# Patient Record
Sex: FEMALE | Race: BLACK OR AFRICAN AMERICAN | NOT HISPANIC OR LATINO | Employment: UNEMPLOYED | URBAN - METROPOLITAN AREA
[De-identification: names, ages, dates, MRNs, and addresses within clinical notes are randomized per-mention and may not be internally consistent; named-entity substitution may affect disease eponyms.]

---

## 2019-06-10 ENCOUNTER — TELEPHONE (OUTPATIENT)
Dept: ENDOCRINOLOGY | Facility: CLINIC | Age: 54
End: 2019-06-10

## 2019-07-29 ENCOUNTER — OFFICE VISIT (OUTPATIENT)
Dept: ENDOCRINOLOGY | Facility: CLINIC | Age: 54
End: 2019-07-29
Payer: COMMERCIAL

## 2019-07-29 VITALS
RESPIRATION RATE: 18 BRPM | HEIGHT: 67 IN | WEIGHT: 293 LBS | BODY MASS INDEX: 45.99 KG/M2 | SYSTOLIC BLOOD PRESSURE: 104 MMHG | DIASTOLIC BLOOD PRESSURE: 76 MMHG | HEART RATE: 96 BPM

## 2019-07-29 DIAGNOSIS — E11.40 TYPE 2 DIABETES MELLITUS WITH DIABETIC NEUROPATHY, WITH LONG-TERM CURRENT USE OF INSULIN: Primary | ICD-10-CM

## 2019-07-29 DIAGNOSIS — E78.5 HYPERLIPIDEMIA, UNSPECIFIED HYPERLIPIDEMIA TYPE: ICD-10-CM

## 2019-07-29 DIAGNOSIS — E66.01 MORBID OBESITY WITH BMI OF 45.0-49.9, ADULT: ICD-10-CM

## 2019-07-29 DIAGNOSIS — Z79.4 TYPE 2 DIABETES MELLITUS WITH RETINOPATHY, WITH LONG-TERM CURRENT USE OF INSULIN, MACULAR EDEMA PRESENCE UNSPECIFIED, UNSPECIFIED LATERALITY, UNSPECIFIED RETINOPATHY SEVERITY: ICD-10-CM

## 2019-07-29 DIAGNOSIS — E11.319 TYPE 2 DIABETES MELLITUS WITH RETINOPATHY, WITH LONG-TERM CURRENT USE OF INSULIN, MACULAR EDEMA PRESENCE UNSPECIFIED, UNSPECIFIED LATERALITY, UNSPECIFIED RETINOPATHY SEVERITY: ICD-10-CM

## 2019-07-29 DIAGNOSIS — Z79.4 TYPE 2 DIABETES MELLITUS WITH DIABETIC NEUROPATHY, WITH LONG-TERM CURRENT USE OF INSULIN: Primary | ICD-10-CM

## 2019-07-29 DIAGNOSIS — I10 ESSENTIAL HYPERTENSION: ICD-10-CM

## 2019-07-29 PROBLEM — E11.9 TYPE 2 DIABETES MELLITUS, WITH LONG-TERM CURRENT USE OF INSULIN: Status: ACTIVE | Noted: 2019-07-29

## 2019-07-29 LAB — GLUCOSE SERPL-MCNC: 69 MG/DL (ref 70–110)

## 2019-07-29 PROCEDURE — 3008F BODY MASS INDEX DOCD: CPT | Mod: CPTII,S$GLB,, | Performed by: NURSE PRACTITIONER

## 2019-07-29 PROCEDURE — 99999 PR PBB SHADOW E&M-EST. PATIENT-LVL V: ICD-10-PCS | Mod: PBBFAC,,, | Performed by: NURSE PRACTITIONER

## 2019-07-29 PROCEDURE — 99999 PR PBB SHADOW E&M-EST. PATIENT-LVL V: CPT | Mod: PBBFAC,,, | Performed by: NURSE PRACTITIONER

## 2019-07-29 PROCEDURE — 82962 GLUCOSE BLOOD TEST: CPT | Mod: S$GLB,,, | Performed by: NURSE PRACTITIONER

## 2019-07-29 PROCEDURE — 82962 POCT GLUCOSE, HAND-HELD DEVICE: ICD-10-PCS | Mod: S$GLB,,, | Performed by: NURSE PRACTITIONER

## 2019-07-29 PROCEDURE — 3008F PR BODY MASS INDEX (BMI) DOCUMENTED: ICD-10-PCS | Mod: CPTII,S$GLB,, | Performed by: NURSE PRACTITIONER

## 2019-07-29 PROCEDURE — 99204 OFFICE O/P NEW MOD 45 MIN: CPT | Mod: S$GLB,,, | Performed by: NURSE PRACTITIONER

## 2019-07-29 PROCEDURE — 99204 PR OFFICE/OUTPT VISIT, NEW, LEVL IV, 45-59 MIN: ICD-10-PCS | Mod: S$GLB,,, | Performed by: NURSE PRACTITIONER

## 2019-07-29 RX ORDER — INSULIN GLARGINE 100 [IU]/ML
INJECTION, SOLUTION SUBCUTANEOUS
Refills: 5 | COMMUNITY
Start: 2019-07-13 | End: 2019-07-29

## 2019-07-29 RX ORDER — BENZONATATE 100 MG/1
CAPSULE ORAL
Refills: 0 | COMMUNITY
Start: 2019-07-17 | End: 2020-07-23

## 2019-07-29 RX ORDER — MONTELUKAST SODIUM 10 MG/1
TABLET ORAL
Refills: 1 | COMMUNITY
Start: 2019-07-13 | End: 2020-07-23

## 2019-07-29 RX ORDER — LOSARTAN POTASSIUM AND HYDROCHLOROTHIAZIDE 25; 100 MG/1; MG/1
TABLET ORAL
Refills: 1 | COMMUNITY
Start: 2019-05-20 | End: 2020-07-23

## 2019-07-29 RX ORDER — GABAPENTIN 300 MG/1
CAPSULE ORAL
Refills: 5 | COMMUNITY
Start: 2019-05-20 | End: 2020-07-23

## 2019-07-29 RX ORDER — BUPROPION HYDROCHLORIDE 150 MG/1
TABLET ORAL
Refills: 1 | COMMUNITY
Start: 2019-07-13

## 2019-07-29 RX ORDER — ASPIRIN 81 MG/1
81 TABLET ORAL DAILY
COMMUNITY
End: 2020-07-23

## 2019-07-29 RX ORDER — ATORVASTATIN CALCIUM 40 MG/1
TABLET, FILM COATED ORAL
Refills: 0 | COMMUNITY
Start: 2019-05-09

## 2019-07-29 RX ORDER — ZOLPIDEM TARTRATE 10 MG/1
TABLET ORAL
Refills: 1 | COMMUNITY
Start: 2019-07-17

## 2019-07-29 RX ORDER — INSULIN GLARGINE 100 [IU]/ML
60 INJECTION, SOLUTION SUBCUTANEOUS NIGHTLY
Qty: 2 BOX | Refills: 6 | Status: SHIPPED | OUTPATIENT
Start: 2019-07-29 | End: 2020-07-23

## 2019-07-29 RX ORDER — TRAZODONE HYDROCHLORIDE 50 MG/1
TABLET ORAL
Refills: 2 | COMMUNITY
Start: 2019-07-13

## 2019-07-29 RX ORDER — QUETIAPINE FUMARATE 200 MG/1
TABLET, FILM COATED ORAL
Refills: 0 | COMMUNITY
Start: 2019-06-17

## 2019-07-29 RX ORDER — INSULIN ASPART 100 [IU]/ML
INJECTION, SOLUTION INTRAVENOUS; SUBCUTANEOUS
Refills: 3 | COMMUNITY
Start: 2019-07-14

## 2019-07-29 RX ORDER — AMLODIPINE BESYLATE 10 MG/1
TABLET ORAL
Refills: 1 | COMMUNITY
Start: 2019-07-13 | End: 2020-07-23

## 2019-07-29 NOTE — LETTER
July 29, 2019      Yumiko Pruitt MD  [address] [facility]  [facility] [place] [address]           [place] - Endocrinology  [address] [facility] [address] [place] [address]  Phone: [phone]  Fax: [phone]          Patient: Tracey Campbell   MR Number: [id number]   YOB: 1965   Date of Visit: 7/29/2019       Dear Dr. Yumiko Pruitt:    Thank you for referring Tracey Campbell to me for evaluation. Attached you will find relevant portions of my assessment and plan of care.    If you have questions, please do not hesitate to call me. I look forward to following Tracey Campbell along with you.    Sincerely,    BRYANT Pittman,FNP-C    Enclosure  CC:  No Recipients    If you would like to receive this communication electronically, please contact [email or web address] or [phone] to request more information on [employer] Link access.    For providers and/or their staff who would like to refer a patient to [facility], please contact us through our one-stop-shop provider referral line, [facility], at [phone].    If you feel you have received this communication in error or would no longer like to receive these types of communications, please e-mail [email or web address]

## 2019-07-29 NOTE — PROGRESS NOTES
CC: Ms. Tracey Campbell arrives today for management of Type 2 DM and review of chronic medical conditions, as listed in the Visit Diagnosis section of this encounter.       HPI: Ms. Tracey Campbell was diagnosed with Type 2 DM in at age 37. She was diagnosed based on lab work. Initial treatment consisted of insulin. She then moved to [place] and began metformin and was able to wean off of insulin after losing 120 lbs. She later gained the weight back and resumed insulin in ~ . + FH of DM in both parents. She was previously hospitalized due to hyperglycemia during UTI. DM is complicated by retinopathy, neuropathy.    This is her first time being seen in endocrine.     Her PCP is Dr. Yumiko Pruitt. She states that her recent A1c was 11% this month.    BG readings are checked 2-3x/day. No log or meter to clinic today. Reports the following:  Fastins  Dinner: 300s    She reports that her BG has been > 500 over the past 2 days. She attributes this to poor dietary choices.     She was 559 this AM and took Novolog 30 units. She then rechecked and glucose was down to 400s range so she took an additional 14 units of Novolog.    Hypoglycemia: No  Hypoglycemic Symptoms: dizziness  Hypoglycemia Treatment: juice    Missing Insulin/PO medication doses: Yes - may miss Novolog when at work.   Timing prandial insulin 5-15 minutes before meals: Usually takes 1 hour ahead of meal.     Exercise: No    Dietary Habits: Eats 1-2 meals/day. Always skips breakfast. Might eat lunch. Always eats dinner. Snacks on chips or fruit. Avoids sugary beverages. .    Last DM education appointment: not recently      CURRENT DIABETIC MEDS: Lantus 45 units QHS, Novolog 30 units AC  Vial or pen: Lantus vial, Novolog pen  Glucometer type: One Touch Ultra    Previous DM treatments:  Metformin  Farxiga - UTIs      Last Eye Exam: 2019, [facility].  +   Last Podiatry Exam: 2019, Dr. Shepherd. H/o chronic Ulcer    REVIEW OF  "SYSTEMS  Constitutional: no c/o fatigue, weakness. + intentional 15# weight loss   Eyes: + blurred vision  ENT: denies dysphagia, hearing loss  Cardiac: no palpitations or chest pain.  Respiratory: no cough. + dyspnea for which she is seeing pulmonology today.  GI: no c/o abdominal pain or nausea. Denies h/o pancreatitis.   : denies urinary frequency, burning, discharge, frequent UTIs.  Skin: no lesions or rashes.  Musculoskeletal: denies difficulty mobilizing, denies muscle or joint pains  Neuro: + numbness, tingling in BLE  Endocrine: denies polyphagia. + polydipsia, polyuria over the past 2 days      Personally reviewed Past Medical, Surgical, Social History.    Vital Signs  /76   Pulse 96   Resp 18   Ht 5' 7" (1.702 m)   Wt (!) 139.5 kg (307 lb 8.7 oz)   BMI 48.17 kg/m²     Personally reviewed the below labs:    No results found for: HGBA1C    Chemistry    No results found for: NA, K, CL, CO2, BUN, CREATININE, GLU No results found for: CALCIUM, ALKPHOS, AST, ALT, BILITOT, ESTGFRAFRICA, EGFRNONAA       No results found for: CHOL  No results found for: HDL  No results found for: LDLCALC  No results found for: TRIG  No results found for: CHOLHDL    No results found for: MICALBCREAT  No results found for: TSH    CrCl cannot be calculated (No order found.).    No results found for: [id number]      PHYSICAL EXAMINATION  Constitutional: Appears well, no distress, morbidly obese  Neck: Supple, trachea midline; no thyromegaly or nodules.   Respiratory: CTA, even and unlabored.  Cardiovascular: RRR, no murmurs, no carotid bruits. DP pulses  2+ bilaterally; no edema.    GI: bowel sounds active, no hernia noted  Lymph: no cervical or supraclavicular lymphadenopathy  Skin: warm and dry; no lipohypertrophy, or acanthosis nigracans observed.  Psych: normal affect, pleasant mood, conversant  Musculoskeletal: steady gait, no clubbing, full ROM to all extremities  Neuro: DTR diminished to BUE/1+BLE.  Feet: " appropriate footwear.      A1c target < 7%      Assessment/Plan  1. Type 2 diabetes mellitus with diabetic neuropathy, with long-term current use of insulin  -- Uncontrolled. POCT glucose 69 mg/dL. I advised pt to only use correction scale if she is having hyperglycemia and skipping a meal. Unequal basal/prandial doses.   -- will weight base pt 0.8-0.9 u/kg. Increase Lantus to 60 units QHS.  -- continue Novolog 30 units AC. Correction scale, target 150, ISF 25. Discussed proper timing,  -- start Ozempic 0.25 mg weekly. After 4 weeks, increase dose to 0.5 mg weekly. Discussed medication's mechanism of action, side effects, and contraindications. Advised pt to notify me for extreme nausea, abdominal pain, etc.  -- check BG 4x/day and bring log to follow up appt  -- Ambulatory Referral to Diabetes Education for diet  -- will obtain recent lab work from PCP  -- call if glucose remains > 400 mg/dL after dose adjustment.     -- Reviewed hypoglycemia management: treat with 1/2 glass of juice, 1/2 can regular coke, or 4 glucose tablets. Monitor and repeat treatment every 15 minutes until BG is >70 Then have a snack, which includes a complex carbohydrate and protein.   Advised patient to check BG before activities, such as driving or exercise.    -- takes aspirin, arb, statin     2. Type 2 diabetes mellitus with retinopathy, with long-term current use of insulin, macular edema presence unspecified, unspecified laterality, unspecified retinopathy severity  -- will call to obtain eye report  -- optimize DM control  -- maintain follow up with Ophtho   3. Essential hypertension  -- controlled  -- continue current meds   4. Hyperlipidemia, unspecified hyperlipidemia type  -- continue statin   5. Morbid obesity with BMI of 45.0-49.9, adult  -- uncontrolled  -- Ozempic may help with weight loss       FOLLOW UP  Follow up in about 6 weeks (around 9/9/2019).   Patient instructed to bring BG logs to each follow up   Patient encouraged  to call for any BG/medication issues, concerns, or questions.    Orders Placed This Encounter   Procedures    Ambulatory Referral to Diabetes Education    POCT Glucose, Hand-Held Device

## 2019-07-30 ENCOUNTER — TELEPHONE (OUTPATIENT)
Dept: ENDOCRINOLOGY | Facility: CLINIC | Age: 54
End: 2019-07-30

## 2019-07-31 ENCOUNTER — TELEPHONE (OUTPATIENT)
Dept: ENDOCRINOLOGY | Facility: CLINIC | Age: 54
End: 2019-07-31

## 2019-07-31 NOTE — TELEPHONE ENCOUNTER
Please call pt and offer her either once weekly Bydureon or daily Victoza. It is her preference. She will need education for Bydureon. Vickyaraza works like her insulin pen.

## 2019-07-31 NOTE — TELEPHONE ENCOUNTER
Pts insurance denied Ozempic PT needs a trial/fail to a formulary alternative  Byetta, Bydureon, Victoza  Please advise on formulary change

## 2019-08-06 ENCOUNTER — TELEPHONE (OUTPATIENT)
Dept: ENDOCRINOLOGY | Facility: CLINIC | Age: 54
End: 2019-08-06

## 2019-08-06 NOTE — TELEPHONE ENCOUNTER
Attempted to reach pt regarding changing Ozempic to another brand, pt did not answer, pt does not have voicemail

## 2019-09-12 ENCOUNTER — TELEPHONE (OUTPATIENT)
Dept: ENDOCRINOLOGY | Facility: CLINIC | Age: 54
End: 2019-09-12

## 2019-11-21 ENCOUNTER — PES CALL (OUTPATIENT)
Dept: ADMINISTRATIVE | Facility: CLINIC | Age: 54
End: 2019-11-21

## 2020-06-15 ENCOUNTER — HOSPITAL ENCOUNTER (OUTPATIENT)
Dept: RADIOLOGY | Facility: HOSPITAL | Age: 55
Discharge: HOME | End: 2020-06-15
Attending: FAMILY MEDICINE
Payer: MEDICAID

## 2020-06-15 VITALS — BODY MASS INDEX: 48.34 KG/M2 | WEIGHT: 293 LBS

## 2020-06-15 DIAGNOSIS — Z12.31 ENCOUNTER FOR SCREENING MAMMOGRAM FOR MALIGNANT NEOPLASM OF BREAST: ICD-10-CM

## 2020-06-15 PROCEDURE — 77063 BREAST TOMOSYNTHESIS BI: CPT | Mod: 26,,, | Performed by: RADIOLOGY

## 2020-06-15 PROCEDURE — 77067 MAMMO DIGITAL SCREENING BILAT WITH TOMOSYNTHESIS_CAD: ICD-10-PCS | Mod: 26,,, | Performed by: RADIOLOGY

## 2020-06-15 PROCEDURE — 77067 SCR MAMMO BI INCL CAD: CPT | Mod: TC,PO

## 2020-06-15 PROCEDURE — 77063 MAMMO DIGITAL SCREENING BILAT WITH TOMOSYNTHESIS_CAD: ICD-10-PCS | Mod: 26,,, | Performed by: RADIOLOGY

## 2020-06-15 PROCEDURE — 77067 SCR MAMMO BI INCL CAD: CPT | Mod: 26,,, | Performed by: RADIOLOGY

## 2020-07-20 ENCOUNTER — OFFICE VISIT (OUTPATIENT)
Dept: URGENT CARE | Facility: CLINIC | Age: 55
End: 2020-07-20
Payer: MEDICAID

## 2020-07-20 VITALS
SYSTOLIC BLOOD PRESSURE: 151 MMHG | DIASTOLIC BLOOD PRESSURE: 88 MMHG | OXYGEN SATURATION: 97 % | TEMPERATURE: 98 F | HEART RATE: 99 BPM

## 2020-07-20 DIAGNOSIS — S91.302A OPEN WOUND OF LEFT FOOT EXCLUDING ONE OR MORE TOES, INITIAL ENCOUNTER: Primary | ICD-10-CM

## 2020-07-20 DIAGNOSIS — N89.8 VAGINAL ITCHING: ICD-10-CM

## 2020-07-20 LAB
BILIRUB UR QL STRIP: NEGATIVE
GLUCOSE UR QL STRIP: POSITIVE
KETONES UR QL STRIP: NEGATIVE
LEUKOCYTE ESTERASE UR QL STRIP: NEGATIVE
PH, POC UA: 5 (ref 5–8)
POC BLOOD, URINE: NEGATIVE
POC NITRATES, URINE: NEGATIVE
PROT UR QL STRIP: POSITIVE
SP GR UR STRIP: 1.02 (ref 1–1.03)
UROBILINOGEN UR STRIP-ACNC: ABNORMAL (ref 0.1–1.1)

## 2020-07-20 PROCEDURE — 81003 URINALYSIS AUTO W/O SCOPE: CPT | Mod: QW,S$GLB,, | Performed by: PHYSICIAN ASSISTANT

## 2020-07-20 PROCEDURE — 99203 PR OFFICE/OUTPT VISIT, NEW, LEVL III, 30-44 MIN: ICD-10-PCS | Mod: 25,S$GLB,, | Performed by: PHYSICIAN ASSISTANT

## 2020-07-20 PROCEDURE — 99203 OFFICE O/P NEW LOW 30 MIN: CPT | Mod: 25,S$GLB,, | Performed by: PHYSICIAN ASSISTANT

## 2020-07-20 PROCEDURE — 81003 POCT URINALYSIS, DIPSTICK, AUTOMATED, W/O SCOPE: ICD-10-PCS | Mod: QW,S$GLB,, | Performed by: PHYSICIAN ASSISTANT

## 2020-07-20 RX ORDER — CETIRIZINE HYDROCHLORIDE 10 MG/1
10 TABLET ORAL
COMMUNITY
Start: 2019-12-16 | End: 2020-07-23

## 2020-07-20 RX ORDER — HYDROCHLOROTHIAZIDE 25 MG/1
TABLET ORAL
COMMUNITY
Start: 2020-04-29 | End: 2021-01-09

## 2020-07-20 RX ORDER — FLUCONAZOLE 150 MG/1
150 TABLET ORAL ONCE
Qty: 1 TABLET | Refills: 1 | Status: SHIPPED | OUTPATIENT
Start: 2020-07-20 | End: 2020-07-20

## 2020-07-20 RX ORDER — ALBUTEROL SULFATE 0.83 MG/ML
SOLUTION RESPIRATORY (INHALATION)
COMMUNITY
Start: 2019-10-30

## 2020-07-20 RX ORDER — DULAGLUTIDE 1.5 MG/.5ML
INJECTION, SOLUTION SUBCUTANEOUS
COMMUNITY
Start: 2020-06-07 | End: 2020-07-20

## 2020-07-20 RX ORDER — FLUTICASONE PROPIONATE 50 MCG
2 SPRAY, SUSPENSION (ML) NASAL
COMMUNITY
Start: 2019-12-16

## 2020-07-21 NOTE — PROGRESS NOTES
Subjective:       Patient ID: Tracey Campbell is a 54 y.o. female.    Vitals:  temperature is 97.8 °F (36.6 °C). Her blood pressure is 151/88 (abnormal) and her pulse is 99. Her oxygen saturation is 97%.     Chief Complaint: Wound Infection    ? Infected wound     Wound Check  She was originally treated more than 14 days ago.       Constitution: Negative for chills and fever.   HENT: Negative for facial swelling and sore throat.    Neck: Negative for painful lymph nodes.   Eyes: Negative for eye itching and eyelid swelling.   Respiratory: Negative for cough.    Musculoskeletal: Negative for joint pain and joint swelling.   Skin: Positive for wound. Negative for color change, pale, abrasion, laceration, lesion, skin thickening/induration, puncture wound, erythema, bruising, abscess, avulsion and hives.   Allergic/Immunologic: Negative for environmental allergies, immunocompromised state and hives.   Hematologic/Lymphatic: Negative for swollen lymph nodes.       Objective:      Physical Exam   Skin: erythema        Assessment:       No diagnosis found.    Plan:         There are no diagnoses linked to this encounter.

## 2020-07-21 NOTE — PROGRESS NOTES
Subjective:       Patient ID: Tracey Campbell is a 54 y.o. female.    Vitals:  temperature is 97.8 °F (36.6 °C). Her blood pressure is 151/88 (abnormal) and her pulse is 99. Her oxygen saturation is 97%.     Chief Complaint: Wound Infection    Pt reports left foot infection.  She states that for 8 weeks she had a PICC line to treat the infection and had a surgery to remove a portion of her bone.  Pt reports that the surgery was in [place].  Pt has now moved back to [place].  She saw the surgeon last week due to her concerns of a recurring infection but states at that time the wound was not draining.  She was not treated with antibiotics at that time and was informed by the surgeon to get an MRI prior to her next visit with him.  Pt did not get the MRI as she was told she would have to pay cash.  She also reports that prior to her last visit with her doctor, she was on a 10 day course of antibiotics but cannot recall the name.    Pt c/o vaginal itching for over a week and states that she will scratch to the point where she has noticed blood.        Constitution: Negative for chills and fever.   HENT: Negative for facial swelling and sore throat.    Neck: Negative for painful lymph nodes.   Eyes: Negative for eye itching and eyelid swelling.   Respiratory: Negative for cough.    Genitourinary: Negative for dysuria, frequency and urgency.   Musculoskeletal: Negative for joint pain and joint swelling.   Skin: Positive for wound. Negative for color change, pale, abrasion, laceration, lesion, skin thickening/induration, puncture wound, erythema, bruising, abscess, avulsion and hives.   Allergic/Immunologic: Negative for environmental allergies, immunocompromised state and hives.   Hematologic/Lymphatic: Negative for swollen lymph nodes.       Objective:      Physical Exam   Constitutional: She is oriented to person, place, and time. She appears well-developed.   HENT:   Head: Normocephalic and atraumatic. Head is without  abrasion, without contusion and without laceration.   Ears:   Right Ear: External ear normal.   Left Ear: External ear normal.   Nose: Nose normal.   Eyes: Pupils are equal, round, and reactive to light. Conjunctivae, EOM and lids are normal.   Neck: Full passive range of motion without pain and phonation normal. Neck supple.   Cardiovascular: Normal rate.   Pulmonary/Chest: Effort normal. No respiratory distress.   Musculoskeletal: Normal range of motion.   Neurological: She is alert and oriented to person, place, and time.   Skin: Skin is warm, dry, intact and no rash. Capillary refill takes less than 2 seconds. Lesions:  lesionabrasion, burn, bruising, erythema and ecchymosisPsychiatric: Her speech is normal and behavior is normal. Judgment and thought content normal.   Nursing note and vitals reviewed.              Results for orders placed or performed in visit on 07/20/20   POCT Urinalysis, Dipstick, Automated, W/O Scope   Result Value Ref Range    POC Blood, Urine Negative Negative    POC Bilirubin, Urine Negative Negative    POC Urobilinogen, Urine norm 0.1 - 1.1    POC Ketones, Urine Negative Negative    POC Protein, Urine Positive (A) Negative    POC Nitrates, Urine Negative Negative    POC Glucose, Urine Positive (A) Negative    pH, UA 5.0 5 - 8    POC Specific Gravity, Urine 1.020 1.003 - 1.029    POC Leukocytes, Urine Negative Negative     Results reviewed with pt  Assessment:       1. Open wound of left foot excluding one or more toes, initial encounter    2. Vaginal itching        Plan:         Open wound of left foot excluding one or more toes, initial encounter  -     Cancel: Ambulatory referral/consult to Wound Clinic  -     Ambulatory referral/consult to Internal Medicine  -     Ambulatory referral/consult to Wound Clinic    Vaginal itching  -     POCT Urinalysis, Dipstick, Automated, W/O Scope  -     NuSwab Vaginitis Plus (VG+)  -     Culture, Urine    Other orders  -     fluconazole (DIFLUCAN)  150 MG Tab; Take 1 tablet (150 mg total) by mouth once. for 1 dose  Dispense: 1 tablet; Refill: 1         Patient Instructions       Candida Vaginal Infection    You have a Candida vaginal infection. This is also known as a yeast infection. It is most often caused by a type of yeast (fungus) called Candida. Candida are normally found in the vagina. But if they increase in number, this can lead to infection and cause symptoms.  Symptoms of a yeast infection can include:  · Clumpy or thin, white discharge, which may look like cottage cheese  · Itching or burning  · Burning with urination  Certain factors can make a yeast infection more likely. These can include:  · Taking certain medicines, such as antibiotics or birth control pills  · Pregnancy  · Diabetes  · Weakened immune system  A yeast infection is most often treated with antifungal medicine. This may be given as a vaginal cream or pills you take by mouth. Treatment may last for about 1 to 7 days. Women with severe or recurrent infections may need longer courses of treatment.  Home care  · If youre prescribed medicine, be sure to use it as directed. Finish all of the medicine, even if your symptoms go away. Note: Dont try to treat yourself using over-the-counter products without talking to your provider first. He or she will let you know if this is a good option for you.  · Ask your provider what steps you can take to help reduce your risk of having a yeast infection in the future.  Follow-up care  Follow up with your healthcare provider, or as directed.  When to seek medical advice  Call your healthcare provider right away if:  · You have a fever of 100.4ºF (38ºC) or higher, or as directed by your provider.  · Your symptoms worsen, or they dont go away within a few days of starting treatment.  · You have new pain in the lower belly or pelvic region.  · You have side effects that bother you or a reaction to the cream or pills youre prescribed.  · You or any  partners you have sex with have new symptoms, such as a rash, joint pain, or sores.  Date Last Reviewed: 7/30/2015  © 8688-9514 [employer]. [address] [place] [address], [place] [address]. All rights reserved. This information is not intended as a substitute for professional medical care. Always follow your healthcare professional's instructions.        Discharge Instructions for Diabetic Foot Ulcers  You have been diagnosed with foot ulcers related to diabetes. Diabetes is a disease that makes it very hard to control your blood sugar. One dangerous complication of diabetes is a higher risk of developing serious foot problems. Wounds, even minor ones, can easily become infected when you have diabetes. These infections can become life-threatening if they aren't treated. Infections that settle in the bones can also spread throughout your foot and leg, destroying bone as they travel.    Your healthcare provider wants you to practice good diabetic foot care. This can help make sure that hot spots, small cracks, or sores are treated before they get infected. If infection is already present, medicines may be prescribed. Follow the tips on this sheet to take better care of your feet.  After surgery  If you have had surgery, change your dressings every 12 hours to prevent infection. Regular wound care helps keep your foot free of infection and aids healing:  · Wash your hands.  · Put on disposable gloves if your foot is infected.  · Gently remove the old dressing and discard it in a plastic bag.  · Take off the gloves.  · Wash your hands again.  · Put on new gloves.  · Clean and dress the wound as directed by your healthcare provider.  · Discard any used materials or trash in a plastic bag before placing in a trash can.  · Dispose of sharp objects in specially designated sharps containers.  Daily foot check   Here is what you can do:  · With a mirror, look at the bottom of your feet every day. This way, you can  catch small skin changes before they turn into bigger problems, such as ulcers, or before they become infected.  · Call your healthcare provider right away if you notice any of the following: hot spots, red streaks, swelling, cracks, sores, injuries, or foreign objects embedded in your foot.  · Before putting on shoes, check the soles and insides for elmer or splinters. Remove these as they could break the skin or put added pressure on your feet.  Foot care  Suggestions include the following:   · Wash your feet every day; use lukewarm (not hot) water and mild soap. Make sure to wash between your toes.  · Use a soft towel to dry your feet well, especially between the toes. Pat gently; don't rub.  · Apply a cream or lanolin lotion, especially on the heels, to keep the skin smooth. If it is cracked, talk to your healthcare provider about how to treat it. Do not apply lotion between the toes as this can lead to fungal infections.   · Dust your feet with nonmedicated powder before putting on shoes, socks, or stockings. This will help keep them dry.  · Before putting on your shoes, check your socks to make sure they are not bunched up. Also make sure they don't have folds or creases in them. Even little bumps created by bunched socks can cause a serious foot wound.   · Talk to your healthcare provider before treating calluses, corns, or bunions.  · To prevent ingrown toenails, cut toenails straight across. Try soaking your toenails in warm water to soften them before cutting.  · Try to keep your feet from getting too hot or too cold.  · Don't go barefoot.  · Avoid rough surfaces or surfaces with sharp objects.  · Don't wear shoes that are too tight or uncomfortable.  · Don't test the temperature of the bathtub water with your feet if you have diminished sensation.  · Follow your healthcare providers instructions about walking and other activity. There may be some restrictions depending on the condition of your feet.  You may need special shoes or inserts to take the pressure off the ulcers.   · Take all medicines exactly as directed.  Follow-Up  Your healthcare provider may refer you to a special wound-care center for treatment.     When to call your healthcare provider  Call your healthcare provider if you have any of the following:  · Fever of 100.4°F (38°C) or higher, or as directed by your healthcare provider   · Redness, swelling, or pain in the foot that doesn't go away  · Numbness or tingling in any part of your foot  · Chills, light-headedness, or fainting  · Odor from wounds or swollen areas   Date Last Reviewed: 6/1/2016  © 5396-4327 [employer]. [address] [place] [address], [place] [id number]. All rights reserved. This information is not intended as a substitute for professional medical care. Always follow your healthcare professional's instructions.      Please be aware your blood pressure was slightly elevated today -  Make sure to take your blood pressure medicines, eat a low salt diet and recheck your blood pressure to make sure it is not getting too elevated ( greater than 160/100). Also make sure to let your doctor know about the elevated reading.

## 2020-07-21 NOTE — PATIENT INSTRUCTIONS
Candida Vaginal Infection    You have a Candida vaginal infection. This is also known as a yeast infection. It is most often caused by a type of yeast (fungus) called Candida. Candida are normally found in the vagina. But if they increase in number, this can lead to infection and cause symptoms.  Symptoms of a yeast infection can include:  · Clumpy or thin, white discharge, which may look like cottage cheese  · Itching or burning  · Burning with urination  Certain factors can make a yeast infection more likely. These can include:  · Taking certain medicines, such as antibiotics or birth control pills  · Pregnancy  · Diabetes  · Weakened immune system  A yeast infection is most often treated with antifungal medicine. This may be given as a vaginal cream or pills you take by mouth. Treatment may last for about 1 to 7 days. Women with severe or recurrent infections may need longer courses of treatment.  Home care  · If youre prescribed medicine, be sure to use it as directed. Finish all of the medicine, even if your symptoms go away. Note: Dont try to treat yourself using over-the-counter products without talking to your provider first. He or she will let you know if this is a good option for you.  · Ask your provider what steps you can take to help reduce your risk of having a yeast infection in the future.  Follow-up care  Follow up with your healthcare provider, or as directed.  When to seek medical advice  Call your healthcare provider right away if:  · You have a fever of 100.4ºF (38ºC) or higher, or as directed by your provider.  · Your symptoms worsen, or they dont go away within a few days of starting treatment.  · You have new pain in the lower belly or pelvic region.  · You have side effects that bother you or a reaction to the cream or pills youre prescribed.  · You or any partners you have sex with have new symptoms, such as a rash, joint pain, or sores.  Date Last Reviewed: 7/30/2015  © 4163-2032 [employer]  [employer]. [address] [place] [address], [place] [address]. All rights reserved. This information is not intended as a substitute for professional medical care. Always follow your healthcare professional's instructions.        Discharge Instructions for Diabetic Foot Ulcers  You have been diagnosed with foot ulcers related to diabetes. Diabetes is a disease that makes it very hard to control your blood sugar. One dangerous complication of diabetes is a higher risk of developing serious foot problems. Wounds, even minor ones, can easily become infected when you have diabetes. These infections can become life-threatening if they aren't treated. Infections that settle in the bones can also spread throughout your foot and leg, destroying bone as they travel.    Your healthcare provider wants you to practice good diabetic foot care. This can help make sure that hot spots, small cracks, or sores are treated before they get infected. If infection is already present, medicines may be prescribed. Follow the tips on this sheet to take better care of your feet.  After surgery  If you have had surgery, change your dressings every 12 hours to prevent infection. Regular wound care helps keep your foot free of infection and aids healing:  · Wash your hands.  · Put on disposable gloves if your foot is infected.  · Gently remove the old dressing and discard it in a plastic bag.  · Take off the gloves.  · Wash your hands again.  · Put on new gloves.  · Clean and dress the wound as directed by your healthcare provider.  · Discard any used materials or trash in a plastic bag before placing in a trash can.  · Dispose of sharp objects in specially designated sharps containers.  Daily foot check   Here is what you can do:  · With a mirror, look at the bottom of your feet every day. This way, you can catch small skin changes before they turn into bigger problems, such as ulcers, or before they become infected.  · Call your healthcare  provider right away if you notice any of the following: hot spots, red streaks, swelling, cracks, sores, injuries, or foreign objects embedded in your foot.  · Before putting on shoes, check the soles and insides for elmer or splinters. Remove these as they could break the skin or put added pressure on your feet.  Foot care  Suggestions include the following:   · Wash your feet every day; use lukewarm (not hot) water and mild soap. Make sure to wash between your toes.  · Use a soft towel to dry your feet well, especially between the toes. Pat gently; don't rub.  · Apply a cream or lanolin lotion, especially on the heels, to keep the skin smooth. If it is cracked, talk to your healthcare provider about how to treat it. Do not apply lotion between the toes as this can lead to fungal infections.   · Dust your feet with nonmedicated powder before putting on shoes, socks, or stockings. This will help keep them dry.  · Before putting on your shoes, check your socks to make sure they are not bunched up. Also make sure they don't have folds or creases in them. Even little bumps created by bunched socks can cause a serious foot wound.   · Talk to your healthcare provider before treating calluses, corns, or bunions.  · To prevent ingrown toenails, cut toenails straight across. Try soaking your toenails in warm water to soften them before cutting.  · Try to keep your feet from getting too hot or too cold.  · Don't go barefoot.  · Avoid rough surfaces or surfaces with sharp objects.  · Don't wear shoes that are too tight or uncomfortable.  · Don't test the temperature of the bathtub water with your feet if you have diminished sensation.  · Follow your healthcare providers instructions about walking and other activity. There may be some restrictions depending on the condition of your feet. You may need special shoes or inserts to take the pressure off the ulcers.   · Take all medicines exactly as directed.  Follow-Up  Your  healthcare provider may refer you to a special wound-care center for treatment.     When to call your healthcare provider  Call your healthcare provider if you have any of the following:  · Fever of 100.4°F (38°C) or higher, or as directed by your healthcare provider   · Redness, swelling, or pain in the foot that doesn't go away  · Numbness or tingling in any part of your foot  · Chills, light-headedness, or fainting  · Odor from wounds or swollen areas   Date Last Reviewed: 6/1/2016  © 7480-1679 [employer]. [address] [place] [address]. All rights reserved. This information is not intended as a substitute for professional medical care. Always follow your healthcare professional's instructions.      Please be aware your blood pressure was slightly elevated today -  Make sure to take your blood pressure medicines, eat a low salt diet and recheck your blood pressure to make sure it is not getting too elevated ( greater than 160/100). Also make sure to let your doctor know about the elevated reading.

## 2020-07-23 ENCOUNTER — HOSPITAL ENCOUNTER (OUTPATIENT)
Dept: WOUND CARE | Facility: HOSPITAL | Age: 55
Discharge: HOME | End: 2020-07-23
Attending: INTERNAL MEDICINE
Payer: MEDICAID

## 2020-07-23 ENCOUNTER — HOSPITAL ENCOUNTER (OUTPATIENT)
Dept: RADIOLOGY | Facility: HOSPITAL | Age: 55
Discharge: HOME | End: 2020-07-23
Attending: INTERNAL MEDICINE
Payer: MEDICAID

## 2020-07-23 VITALS
RESPIRATION RATE: 20 BRPM | DIASTOLIC BLOOD PRESSURE: 67 MMHG | SYSTOLIC BLOOD PRESSURE: 118 MMHG | TEMPERATURE: 97 F | WEIGHT: 293 LBS | HEIGHT: 67 IN | HEART RATE: 102 BPM | BODY MASS INDEX: 45.99 KG/M2

## 2020-07-23 DIAGNOSIS — Z79.4 TYPE 2 DIABETES MELLITUS WITH FOOT ULCER, WITH LONG-TERM CURRENT USE OF INSULIN: ICD-10-CM

## 2020-07-23 DIAGNOSIS — L97.509 TYPE 2 DIABETES MELLITUS WITH FOOT ULCER, WITH LONG-TERM CURRENT USE OF INSULIN: ICD-10-CM

## 2020-07-23 DIAGNOSIS — E11.621 TYPE 2 DIABETES MELLITUS WITH FOOT ULCER, WITH LONG-TERM CURRENT USE OF INSULIN: ICD-10-CM

## 2020-07-23 DIAGNOSIS — E11.40 TYPE 2 DIABETES MELLITUS WITH DIABETIC NEUROPATHY, WITH LONG-TERM CURRENT USE OF INSULIN: Primary | ICD-10-CM

## 2020-07-23 DIAGNOSIS — Z79.4 TYPE 2 DIABETES MELLITUS WITH DIABETIC NEUROPATHY, WITH LONG-TERM CURRENT USE OF INSULIN: Primary | ICD-10-CM

## 2020-07-23 PROCEDURE — 73630 XR FOOT COMPLETE 3 VIEW LEFT: ICD-10-PCS | Mod: 26,LT,, | Performed by: RADIOLOGY

## 2020-07-23 PROCEDURE — 99203 OFFICE O/P NEW LOW 30 MIN: CPT | Mod: 25,,, | Performed by: INTERNAL MEDICINE

## 2020-07-23 PROCEDURE — 73630 X-RAY EXAM OF FOOT: CPT | Mod: 26,LT,, | Performed by: RADIOLOGY

## 2020-07-23 PROCEDURE — 99203 PR OFFICE/OUTPT VISIT, NEW, LEVL III, 30-44 MIN: ICD-10-PCS | Mod: 25,,, | Performed by: INTERNAL MEDICINE

## 2020-07-23 PROCEDURE — 11043 DBRDMT MUSC&/FSCA 1ST 20/<: CPT

## 2020-07-23 PROCEDURE — 73630 X-RAY EXAM OF FOOT: CPT | Mod: TC,FY,LT

## 2020-07-23 PROCEDURE — 87070 CULTURE OTHR SPECIMN AEROBIC: CPT

## 2020-07-23 PROCEDURE — 87147 CULTURE TYPE IMMUNOLOGIC: CPT

## 2020-07-23 PROCEDURE — 11043 DEBRIDEMENT: ICD-10-PCS | Mod: ,,, | Performed by: INTERNAL MEDICINE

## 2020-07-23 PROCEDURE — 11043 DBRDMT MUSC&/FSCA 1ST 20/<: CPT | Mod: ,,, | Performed by: INTERNAL MEDICINE

## 2020-07-23 RX ORDER — ATORVASTATIN CALCIUM 40 MG/1
TABLET, FILM COATED ORAL
COMMUNITY
Start: 2020-01-27

## 2020-07-23 RX ORDER — ZOLPIDEM TARTRATE 10 MG/1
10 TABLET ORAL
COMMUNITY
End: 2020-07-23

## 2020-07-23 RX ORDER — INSULIN ASPART 100 [IU]/ML
INJECTION, SUSPENSION SUBCUTANEOUS
COMMUNITY
Start: 2020-01-27 | End: 2020-07-30

## 2020-07-23 RX ORDER — BUPROPION HYDROCHLORIDE 150 MG/1
TABLET ORAL
COMMUNITY
Start: 2020-01-27

## 2020-07-23 RX ORDER — GABAPENTIN 300 MG/1
CAPSULE ORAL
COMMUNITY
Start: 2018-09-24 | End: 2020-07-23

## 2020-07-23 RX ORDER — TRAZODONE HYDROCHLORIDE 50 MG/1
50 TABLET ORAL
COMMUNITY

## 2020-07-23 RX ORDER — QUETIAPINE FUMARATE 200 MG/1
TABLET, FILM COATED ORAL
COMMUNITY
Start: 2018-09-24

## 2020-07-23 RX ORDER — DULAGLUTIDE 1.5 MG/.5ML
INJECTION, SOLUTION SUBCUTANEOUS
COMMUNITY
Start: 2020-01-27

## 2020-07-23 RX ORDER — LOSARTAN POTASSIUM 100 MG/1
100 TABLET ORAL
COMMUNITY
Start: 2020-01-27 | End: 2020-07-23

## 2020-07-23 RX ORDER — NAPROXEN SODIUM 220 MG/1
81 TABLET, FILM COATED ORAL
COMMUNITY
End: 2020-07-23

## 2020-07-23 RX ORDER — PEN NEEDLE, DIABETIC 32 GX 1/4"
NEEDLE, DISPOSABLE MISCELLANEOUS
COMMUNITY
Start: 2020-05-01

## 2020-07-23 RX ORDER — AMLODIPINE BESYLATE 10 MG/1
10 TABLET ORAL
COMMUNITY
Start: 2020-01-27 | End: 2020-07-23

## 2020-07-23 NOTE — PROCEDURES
Debridement    Date/Time: 7/23/2020 10:22 AM  Performed by: Riley Campbell MD  Authorized by: Riley Campbell MD     Consent Done?:  Yes (Verbal)  Local anesthetic:  Topical anesthetic    Wound Details:    Location:  Left foot    Location:  Left 1st Metatarsal Head       Length (cm):  2.5       Area (sq cm):  7.5       Width (cm):  3       Percent Debrided (%):  100       Depth (cm):  0.4       Total Area Debrided (sq cm):  7.5    Depth of debridement:  Muscle/fascia/tendon    Tissue debrided:  Subcutaneous and Tendon    Devitalized tissue debrided:  Callus, Biofilm, Slough and Necrotic/Eschar    Instruments:  Curette    Bleeding:  Minimal  Hemostasis Achieved: Yes    Method Used:  Pressure  Patient tolerance:  Patient tolerated the procedure well with no immediate complications

## 2020-07-23 NOTE — PROGRESS NOTES
"[facility]                Progress Note    Subjective:       Patient ID: Tracey Campbell is a 54 y.o. female.    Chief Complaint: No chief complaint on file.    Pt developed wound on plantar area foot, was being tx in [place]. Had 8 weeks PICC ABX then "removed some bone due to infection." Recently noticed some white drainage from wound. No odor or redness. Medial ffot extending to plantar area has hard callus covering old surgery site. Thick white drainage from top of foot and thick sero/sanf drainage from medial area. Add nurse visit on Monday to check drainage.    Left foot wound x five months (previously heeled right foot wound as well) recently moved back to local area. No pain no fevers or chills using darco shoe with gauze wrapping changing dressing ocne per day notes some clear draiange opne week ago last on IV antibotics two months ago. reprots home glucose consistently > 300 taking both short acting and basal insulin as well as GLP1 agonist weekly no hypoglycemia does not have PCP in local area      Review of Systems   Constitutional: Negative for activity change, appetite change, fatigue, fever and unexpected weight change.   HENT: Negative for ear pain, rhinorrhea and sore throat.    Eyes: Negative for discharge and visual disturbance.   Respiratory: Negative for chest tightness, shortness of breath and wheezing.    Cardiovascular: Negative for chest pain, palpitations and leg swelling.   Gastrointestinal: Negative for abdominal pain, constipation and diarrhea.   Endocrine: Negative for cold intolerance and heat intolerance.   Genitourinary: Negative for dysuria and hematuria.   Musculoskeletal: Negative for joint swelling and neck stiffness.   Skin: Positive for wound. Negative for rash.   Neurological: Negative for dizziness, syncope, weakness and headaches.   Psychiatric/Behavioral: Negative for suicidal ideas.         Objective:        Physical " Exam  Constitutional:       Appearance: She is well-developed.   HENT:      Head: Normocephalic and atraumatic.   Eyes:      General: No scleral icterus.     Conjunctiva/sclera: Conjunctivae normal.   Neck:      Musculoskeletal: Normal range of motion.   Cardiovascular:      Rate and Rhythm: Normal rate and regular rhythm.      Heart sounds: No murmur. No friction rub. No gallop.       Comments: 2+ DP and PT on right foot   Pulmonary:      Effort: Pulmonary effort is normal.      Breath sounds: Normal breath sounds. No wheezing or rales.   Abdominal:      General: There is no distension.      Palpations: Abdomen is soft.      Tenderness: There is no abdominal tenderness. There is no guarding or rebound.   Musculoskeletal: Normal range of motion.         General: No tenderness.   Skin:     General: Skin is warm and dry.      Comments: Significant large callous along left first MTP there is two sinus tracts one dorsal and one lateral of first MTP expressible thick purulent matter. With removal of callous the lateral sinus tract was fluctuane with non viable slough and skin deep subcutaneous tissue to level of first tendon    Neurological:      Mental Status: She is alert and oriented to person, place, and time.      Cranial Nerves: No cranial nerve deficit.         Vitals:    07/23/20 0817   BP: 118/67   Pulse: 102   Resp: 20   Temp: 97.2 °F (36.2 °C)       Assessment:           ICD-10-CM ICD-9-CM   1. Type 2 diabetes mellitus with foot ulcer, with long-term current use of insulin  E11.621 250.80    L97.509 707.15    Z79.4 V58.67            Wound 07/23/20 (Active)   07/23/20     Pre-existing:    Primary Wound Type:    Side:    Orientation:    Location:    Wound Number (optional):    Ankle-Brachial Index:    Pulses:    Removal Indication and Assessment:    Wound Outcome:    (Retired) Wound Type:    (Retired) Wound Length (cm):    (Retired) Wound Width (cm):    (Retired) Depth (cm):    Wound Description (Comments):     Removal Indications:    Wound Image   07/23/20 1000   Wound WDL ex 07/23/20 0800   Dressing Appearance Dry;Intact 07/23/20 0800   Drainage Characteristics/Odor Serosanguineous;Other (see comments) 07/23/20 0800   Appearance Arroyo 07/23/20 0800   Red (%), Wound Tissue Color 100 % 07/23/20 0800   Periwound Area Other (see comments) 07/23/20 0800   Wound Length (cm) 3.3 cm 07/23/20 0800   Wound Width (cm) 0.7 cm 07/23/20 0800   Wound Depth (cm) 0.3 cm 07/23/20 0800   Wound Volume (cm^3) 0.69 cm^3 07/23/20 0800   Wound Surface Area (cm^2) 2.31 cm^2 07/23/20 0800   Care Cleansed with:;Antimicrobial agent;Sterile normal saline 07/23/20 0800   Dressing Changed 07/23/20 0800   Off Loading Football dressing 07/23/20 0800   Dressing Change Due 07/30/20 07/23/20 0800           Plan:              Discussed with patient need to get improve control of sugars she has been referred to establish PCP also with deep soft tissue infection by exam culture taken today to target antibiotic therapy concern for possible osteo given depth of wound will start with xray and inflammatory markers offlaoding dressing packed with silver alginate for antimicrobial effect. Good pulses and capillary bleedign noted during debridement doubt significant PAD as contributing to wound   Orders Placed This Encounter   Procedures    Aerobic culture    CBC auto differential     Standing Status:   Future     Standing Expiration Date:   7/23/2021    Comprehensive metabolic panel     Standing Status:   Future     Standing Expiration Date:   7/23/2021    Prealbumin     Standing Status:   Future     Standing Expiration Date:   9/21/2021    Hemoglobin A1C     Standing Status:   Future     Standing Expiration Date:   7/23/2021    Sedimentation rate     Standing Status:   Future     Standing Expiration Date:   7/23/2021    C-Reactive Protein     Standing Status:   Future     Standing Expiration Date:   7/23/2021    Change dressing     Aquacelm AG ribbon to  wound bed cover with aquacel foam and x3 cast padding football/coban. Dee Dee Mary. Nurse visit on Monday and Dr visit Thursday        Follow up in about 1 week (around 7/30/2020) for nurse visit on Monday.

## 2020-07-24 ENCOUNTER — TELEPHONE (OUTPATIENT)
Dept: FAMILY MEDICINE | Facility: CLINIC | Age: 55
End: 2020-07-24

## 2020-07-26 LAB
BACTERIA SPEC AEROBE CULT: ABNORMAL
BACTERIA UR CULT: ABNORMAL
BACTERIA UR CULT: ABNORMAL

## 2020-07-27 ENCOUNTER — TELEPHONE (OUTPATIENT)
Dept: FAMILY MEDICINE | Facility: CLINIC | Age: 55
End: 2020-07-27

## 2020-07-27 ENCOUNTER — HOSPITAL ENCOUNTER (OUTPATIENT)
Dept: WOUND CARE | Facility: HOSPITAL | Age: 55
Discharge: HOME | End: 2020-07-27
Attending: FAMILY MEDICINE
Payer: MEDICAID

## 2020-07-27 VITALS
RESPIRATION RATE: 17 BRPM | TEMPERATURE: 98 F | HEART RATE: 101 BPM | DIASTOLIC BLOOD PRESSURE: 72 MMHG | SYSTOLIC BLOOD PRESSURE: 134 MMHG

## 2020-07-27 DIAGNOSIS — Z79.4 TYPE 2 DIABETES MELLITUS WITH FOOT ULCER, WITH LONG-TERM CURRENT USE OF INSULIN: ICD-10-CM

## 2020-07-27 DIAGNOSIS — E11.621 TYPE 2 DIABETES MELLITUS WITH FOOT ULCER, WITH LONG-TERM CURRENT USE OF INSULIN: ICD-10-CM

## 2020-07-27 DIAGNOSIS — E11.621 TYPE 2 DIABETES MELLITUS WITH FOOT ULCER, WITH LONG-TERM CURRENT USE OF INSULIN: Primary | ICD-10-CM

## 2020-07-27 DIAGNOSIS — L97.509 TYPE 2 DIABETES MELLITUS WITH FOOT ULCER, WITH LONG-TERM CURRENT USE OF INSULIN: Primary | ICD-10-CM

## 2020-07-27 DIAGNOSIS — Z79.4 TYPE 2 DIABETES MELLITUS WITH FOOT ULCER, WITH LONG-TERM CURRENT USE OF INSULIN: Primary | ICD-10-CM

## 2020-07-27 DIAGNOSIS — L97.509 TYPE 2 DIABETES MELLITUS WITH FOOT ULCER, WITH LONG-TERM CURRENT USE OF INSULIN: ICD-10-CM

## 2020-07-27 PROCEDURE — 99213 OFFICE O/P EST LOW 20 MIN: CPT | Performed by: FAMILY MEDICINE

## 2020-07-27 RX ORDER — AMOXICILLIN 500 MG/1
500 TABLET, FILM COATED ORAL EVERY 8 HOURS
Qty: 42 TABLET | Refills: 0 | Status: SHIPPED | OUTPATIENT
Start: 2020-07-27 | End: 2020-08-10

## 2020-07-27 RX ORDER — AMOXICILLIN 500 MG/1
500 TABLET, FILM COATED ORAL EVERY 8 HOURS
Qty: 42 TABLET | Refills: 0 | Status: SHIPPED | OUTPATIENT
Start: 2020-07-27 | End: 2020-07-27

## 2020-07-27 NOTE — TELEPHONE ENCOUNTER
----- Message from Rosa Dickinson sent at 7/27/2020  9:00 AM CDT -----  Regarding: Patient Prescription  Good Morning,    I spoke to Mrs. Campbell regarding her antibiotic. She informed that the current pharmacy she uses is the [facility]. I have updated her chart to reflect this. Can you please have the medication transferred?

## 2020-07-27 NOTE — TELEPHONE ENCOUNTER
No answer at listed number goes directly to voice mail. Script sent to cover GBS with amoxicillin TID x 14 days

## 2020-07-27 NOTE — PROGRESS NOTES
[facility]  [address]  [id number]  Nurse Visit    Subjective:       Patient seen in clinic today.  Dressing changed as ordered. Unable to view wound bed due to callused over.      Assessment:          Wound 07/23/20 Diabetic Ulcer Left medial Foot (Active)   07/23/20     Pre-existing: No   Primary Wound Type: Diabetic ulc   Side: Left   Orientation: medial   Location: Foot   Wound Number (optional):    Ankle-Brachial Index:    Pulses:    Removal Indication and Assessment:    Wound Outcome:    (Retired) Wound Type:    (Retired) Wound Length (cm):    (Retired) Wound Width (cm):    (Retired) Depth (cm):    Wound Description (Comments):    Removal Indications:    Wound Image   07/27/20 0800   Wound WDL ex 07/27/20 0800   Dressing Appearance Dry;Intact;Dried drainage 07/27/20 0800   Drainage Amount Scant 07/27/20 0800   Drainage Characteristics/Odor Serosanguineous 07/27/20 0800   Appearance Dry 07/27/20 0800   Tissue loss description Not applicable 07/27/20 0800   Periwound Area Dry 07/27/20 0800   Wound Edges Callused 07/27/20 0800   Wound Length (cm) 2 cm 07/27/20 0800   Wound Width (cm) 1.5 cm 07/27/20 0800   Wound Depth (cm) 0.1 cm 07/27/20 0800   Wound Volume (cm^3) 0.3 cm^3 07/27/20 0800   Wound Surface Area (cm^2) 3 cm^2 07/27/20 0800   Care Soap and water;Sterile normal saline;Wound cleanser 07/27/20 0800   Dressing Applied;Other (see comments);Foam 07/27/20 0800   Off Loading Football dressing 07/27/20 0800   Dressing Change Due 07/30/20 07/27/20 0800           Plan:     No orders of the defined types were placed in this encounter.          No follow-ups on file.

## 2020-07-29 ENCOUNTER — TELEPHONE (OUTPATIENT)
Dept: URGENT CARE | Facility: CLINIC | Age: 55
End: 2020-07-29

## 2020-07-29 LAB
A VAGINAE DNA VAG QL NAA+PROBE: NORMAL SCORE
BVAB2 DNA VAG QL NAA+PROBE: NORMAL SCORE
C ALBICANS DNA VAG QL NAA+PROBE: NEGATIVE
C GLABRATA DNA VAG QL NAA+PROBE: NEGATIVE
C TRACH DNA VAG QL NAA+PROBE: NEGATIVE
MEGA1 DNA VAG QL NAA+PROBE: NORMAL SCORE
N GONORRHOEA DNA VAG QL NAA+PROBE: NEGATIVE
T VAGINALIS DNA VAG QL NAA+PROBE: NEGATIVE

## 2020-07-29 NOTE — TELEPHONE ENCOUNTER
Spoke with patient regarding her negative vaginitis panel and positive UCx for GBS. Patient was not treated at this visit however her PCP sent in some Amoxicillin 2 days ago. Reports that she began taking the antibiotic already. Instructed to complete entire course and f/u with her PCP for any persistent or worsening symptoms. Patient verbalized understanding.

## 2020-07-30 ENCOUNTER — HOSPITAL ENCOUNTER (OUTPATIENT)
Dept: WOUND CARE | Facility: HOSPITAL | Age: 55
Discharge: HOME | End: 2020-07-30
Attending: INTERNAL MEDICINE
Payer: MEDICAID

## 2020-07-30 VITALS
DIASTOLIC BLOOD PRESSURE: 72 MMHG | RESPIRATION RATE: 20 BRPM | HEART RATE: 102 BPM | TEMPERATURE: 98 F | SYSTOLIC BLOOD PRESSURE: 142 MMHG

## 2020-07-30 DIAGNOSIS — Z79.4 TYPE 2 DIABETES MELLITUS WITH FOOT ULCER, WITH LONG-TERM CURRENT USE OF INSULIN: Primary | ICD-10-CM

## 2020-07-30 DIAGNOSIS — E11.40 TYPE 2 DIABETES MELLITUS WITH DIABETIC NEUROPATHY, WITH LONG-TERM CURRENT USE OF INSULIN: ICD-10-CM

## 2020-07-30 DIAGNOSIS — Z79.4 TYPE 2 DIABETES MELLITUS WITH DIABETIC NEUROPATHY, WITH LONG-TERM CURRENT USE OF INSULIN: ICD-10-CM

## 2020-07-30 DIAGNOSIS — L97.509 TYPE 2 DIABETES MELLITUS WITH FOOT ULCER, WITH LONG-TERM CURRENT USE OF INSULIN: Primary | ICD-10-CM

## 2020-07-30 DIAGNOSIS — E11.621 TYPE 2 DIABETES MELLITUS WITH FOOT ULCER, WITH LONG-TERM CURRENT USE OF INSULIN: Primary | ICD-10-CM

## 2020-07-30 PROCEDURE — 11056 PARNG/CUTG B9 HYPRKR LES 2-4: CPT | Performed by: INTERNAL MEDICINE

## 2020-07-30 PROCEDURE — 11043 DBRDMT MUSC&/FSCA 1ST 20/<: CPT | Mod: ,,, | Performed by: INTERNAL MEDICINE

## 2020-07-30 PROCEDURE — 11043 DBRDMT MUSC&/FSCA 1ST 20/<: CPT | Performed by: INTERNAL MEDICINE

## 2020-07-30 PROCEDURE — 11043 WOUND DEBRIDEMENT: ICD-10-PCS | Mod: ,,, | Performed by: INTERNAL MEDICINE

## 2020-07-30 PROCEDURE — 99214 OFFICE O/P EST MOD 30 MIN: CPT | Mod: 25,,, | Performed by: INTERNAL MEDICINE

## 2020-07-30 PROCEDURE — 99214 PR OFFICE/OUTPT VISIT, EST, LEVL IV, 30-39 MIN: ICD-10-PCS | Mod: 25,,, | Performed by: INTERNAL MEDICINE

## 2020-07-30 RX ORDER — INSULIN GLARGINE 100 [IU]/ML
60 INJECTION, SOLUTION SUBCUTANEOUS DAILY
Qty: 15 ML | Refills: 1 | Status: SHIPPED | OUTPATIENT
Start: 2020-07-30 | End: 2021-07-30

## 2020-07-30 NOTE — PROGRESS NOTES
[facility]                Progress Note    Subjective:       Patient ID: Tracey Campbell is a 54 y.o. female.    Chief Complaint: Wound Check    F/u wound care visit. Patient denies pain, fever, cough, chills or SOB. Wound dressing to left foot intact with no breakthrough drainage. Removed dressing to wound and small, brownish drainage noted. No improvement in wound. Patient continues oral antibiotics. 4% lidocaine cream applied to wound. Rx of Lantus given to patient. Wound debridement done, tolerated well. RTC in one week.    Taking amoxicillin as prescribed for GBS from foot culture. No problems with antibiotics no skin rash or diarrhea. Unable to schedule with [facility] due to her medicaid coverage. Dressing has remained dry noted nursing note with increase callous formation      Review of Systems   Constitutional: Negative for activity change, appetite change, fatigue, fever and unexpected weight change.   HENT: Negative for ear pain, rhinorrhea and sore throat.    Eyes: Negative for discharge and visual disturbance.   Respiratory: Negative for chest tightness, shortness of breath and wheezing.    Cardiovascular: Negative for chest pain, palpitations and leg swelling.   Gastrointestinal: Negative for abdominal pain, constipation and diarrhea.   Endocrine: Negative for cold intolerance and heat intolerance.   Genitourinary: Negative for dysuria and hematuria.   Musculoskeletal: Negative for joint swelling and neck stiffness.   Skin: Negative for rash.   Neurological: Negative for dizziness, syncope, weakness and headaches.   Psychiatric/Behavioral: Negative for suicidal ideas.         Objective:        Physical Exam  Constitutional:       General: She is not in acute distress.     Appearance: Normal appearance. She is obese. She is not ill-appearing.   HENT:      Head: Normocephalic and atraumatic.   Eyes:      General: No scleral icterus.  Pulmonary:       Effort: Pulmonary effort is normal. No respiratory distress.   Abdominal:      General: There is no distension.      Palpations: Abdomen is soft.      Tenderness: There is no abdominal tenderness.   Skin:     Comments: Dorsal ulceration with scant purulent drainage with palpation of periwoudn (improved compared to last week) new callous formation from dorsal first MTP to plantar surface see debridement note   Neurological:      General: No focal deficit present.      Mental Status: She is alert and oriented to person, place, and time.   Psychiatric:         Mood and Affect: Mood normal.         Behavior: Behavior normal.         Vitals:    07/30/20 0816   BP: (!) 142/72   Pulse: 102   Resp: 20   Temp: 97.7 °F (36.5 °C)       Assessment:           ICD-10-CM ICD-9-CM   1. Type 2 diabetes mellitus with foot ulcer, with long-term current use of insulin  E11.621 250.80    L97.509 707.15    Z79.4 V58.67   2. Type 2 diabetes mellitus with diabetic neuropathy, with long-term current use of insulin  E11.40 250.60    Z79.4 357.2     V58.67            Wound 07/23/20 Diabetic Ulcer Left medial Foot (Active)   07/23/20     Pre-existing: No   Primary Wound Type: Diabetic ulc   Side: Left   Orientation: medial   Location: Foot   Wound Number (optional):    Ankle-Brachial Index:    Pulses:    Removal Indication and Assessment:    Wound Outcome:    (Retired) Wound Type:    (Retired) Wound Length (cm):    (Retired) Wound Width (cm):    (Retired) Depth (cm):    Wound Description (Comments):    Removal Indications:    Wound Image    07/30/20 0800   Wound WDL ex 07/30/20 0800   Dressing Appearance Intact;Dried drainage 07/30/20 0800   Drainage Amount Small 07/30/20 0800   Drainage Characteristics/Odor Serous 07/30/20 0800   Appearance Dry 07/30/20 0800   Tissue loss description Not applicable 07/30/20 0800   Black (%), Wound Tissue Color 0 % 07/30/20 0800   Periwound Area Dry 07/30/20 0800   Wound Edges Callused 07/30/20 0800   Wound  Length (cm) 1 cm 07/30/20 0800   Wound Width (cm) 0.9 cm 07/30/20 0800   Wound Depth (cm) 0.1 cm 07/30/20 0800   Wound Volume (cm^3) 0.09 cm^3 07/30/20 0800   Wound Surface Area (cm^2) 0.9 cm^2 07/30/20 0800   Care Cleansed with:;Soap and water;Sterile normal saline 07/30/20 0800   Dressing Applied;Foam 07/30/20 0800   Off Loading Football dressing;Off loading shoe;Other (see comments) 07/30/20 0800   Dressing Change Due 08/06/20 07/30/20 0800            Wound 07/30/20 0857 Diabetic Ulcer Left plantar Foot (Active)   07/30/20 0857    Pre-existing:    Primary Wound Type: Diabetic ulc   Side: Left   Orientation: plantar   Location: Foot   Wound Number (optional):    Ankle-Brachial Index:    Pulses:    Removal Indication and Assessment:    Wound Outcome:    (Retired) Wound Type:    (Retired) Wound Length (cm):    (Retired) Wound Width (cm):    (Retired) Depth (cm):    Wound Description (Comments):    Removal Indications:    Wound Image   07/30/20 0800   Wound WDL ex 07/30/20 0800   Dressing Appearance No dressing 07/30/20 0800   Drainage Characteristics/Odor Serosanguineous 07/30/20 0800   Appearance Red;Pink 07/30/20 0800   Tissue loss description Partial thickness 07/30/20 0800   Black (%), Wound Tissue Color 0 % 07/30/20 0800   Red (%), Wound Tissue Color 100 % 07/30/20 0800   Yellow (%), Wound Tissue Color 0 % 07/30/20 0800   Periwound Area Dry 07/30/20 0800   Wound Edges Callused 07/30/20 0800   Wound Length (cm) 0.3 cm 07/30/20 0800   Wound Width (cm) 0.3 cm 07/30/20 0800   Wound Depth (cm) 0.2 cm 07/30/20 0800   Wound Volume (cm^3) 0.02 cm^3 07/30/20 0800   Wound Surface Area (cm^2) 0.09 cm^2 07/30/20 0800   Care Cleansed with:;Sterile normal saline 07/30/20 0800   Dressing Applied;Calcium alginate;Silver;Other (see comments) 07/30/20 0800   Off Loading Football dressing;Off loading shoe 07/30/20 0800   Dressing Change Due 08/06/20 07/30/20 0800           Plan:          hyperglycemia inhibiting wound healing.  Instructed patient to increase basal and prepradnial insulin by 20% ( 60 units levemir nightly and 34 units novolog before meals) contact information for [facility] given to patient to establish PCP for further insulin titration and health maintenance. Continue amoxicillin x 14 days for deep soft tissue infected diabetic foot wound, silver alginate to each wound bed to prevent abscess formation return one week       Orders Placed This Encounter   Procedures    Change dressing     Aquacel AG ribbon to wound bed cover with aquacel foam, jordana foam short x1, jordana long x1 and football (cast padding x3, coban), norma.        Follow up in about 1 week (around 8/6/2020) for wound care.

## 2020-07-30 NOTE — PROCEDURES
Wound Debridement    Date/Time: 7/30/2020 8:25 AM  Performed by: Riley Campbell MD  Authorized by: Riley Campbell MD     Consent Done?:  Yes (Verbal)  Local anesthesia used?: Yes    Local anesthetic:  Topical anesthetic    Wound Details:    Location:  Left foot    Location:  Left 1st Metatarsal Head    Type of Debridement:  Excisional       Length (cm):  2       Area (sq cm):  0.4       Width (cm):  0.2       Percent Debrided (%):  100       Depth (cm):  0.2       Total Area Debrided (sq cm):  0.4    Depth of debridement:  Muscle/fascia/tendon    Tissue debrided:  Subcutaneous and Tendon    Devitalized tissue debrided:  Biofilm, Callus, Necrotic/Eschar and Slough    Instruments:  Curette and Blade    Bleeding:  Minimal  Hemostasis Achieved: Yes    Method Used:  Pressure     Two distinct openings noted one dorsal and one plantar interwound callous paired down without evidence of communicating tract

## 2020-08-06 ENCOUNTER — HOSPITAL ENCOUNTER (OUTPATIENT)
Dept: WOUND CARE | Facility: HOSPITAL | Age: 55
Discharge: HOME | End: 2020-08-06
Attending: INTERNAL MEDICINE
Payer: MEDICAID

## 2020-08-06 VITALS
HEART RATE: 95 BPM | SYSTOLIC BLOOD PRESSURE: 124 MMHG | TEMPERATURE: 99 F | RESPIRATION RATE: 17 BRPM | DIASTOLIC BLOOD PRESSURE: 81 MMHG

## 2020-08-06 DIAGNOSIS — Z79.4 TYPE 2 DIABETES MELLITUS WITH FOOT ULCER, WITH LONG-TERM CURRENT USE OF INSULIN: Primary | ICD-10-CM

## 2020-08-06 DIAGNOSIS — L97.509 TYPE 2 DIABETES MELLITUS WITH FOOT ULCER, WITH LONG-TERM CURRENT USE OF INSULIN: Primary | ICD-10-CM

## 2020-08-06 DIAGNOSIS — Z79.4 TYPE 2 DIABETES MELLITUS WITH DIABETIC NEUROPATHY, WITH LONG-TERM CURRENT USE OF INSULIN: ICD-10-CM

## 2020-08-06 DIAGNOSIS — E11.621 TYPE 2 DIABETES MELLITUS WITH FOOT ULCER, WITH LONG-TERM CURRENT USE OF INSULIN: Primary | ICD-10-CM

## 2020-08-06 DIAGNOSIS — E11.40 TYPE 2 DIABETES MELLITUS WITH DIABETIC NEUROPATHY, WITH LONG-TERM CURRENT USE OF INSULIN: ICD-10-CM

## 2020-08-06 PROCEDURE — 99214 OFFICE O/P EST MOD 30 MIN: CPT | Mod: ,,, | Performed by: INTERNAL MEDICINE

## 2020-08-06 PROCEDURE — 11055 PARING/CUTG B9 HYPRKER LES 1: CPT | Performed by: INTERNAL MEDICINE

## 2020-08-06 PROCEDURE — 99214 PR OFFICE/OUTPT VISIT, EST, LEVL IV, 30-39 MIN: ICD-10-PCS | Mod: ,,, | Performed by: INTERNAL MEDICINE

## 2020-08-06 NOTE — PROGRESS NOTES
[facility]                Progress Note    Subjective:       Patient ID: Tracey Campbell is a 54 y.o. female.    Chief Complaint: Non-healing Wound Follow Up and Diabetic Foot Ulcer    08/6/2020: F/U visit. Wounds are healed. Security football wrap for one week. Paired callused.    No drainage continues antibiotics as prescribed no diarrhea no fevers/chills increased insulin as discussed last week scheduled virtual appointment with PCP ([facility]) for next monday      Review of Systems   Constitutional: Negative for activity change, appetite change, fatigue, fever and unexpected weight change.   HENT: Negative for ear pain, rhinorrhea and sore throat.    Eyes: Negative for discharge and visual disturbance.   Respiratory: Negative for chest tightness, shortness of breath and wheezing.    Cardiovascular: Negative for chest pain, palpitations and leg swelling.   Gastrointestinal: Negative for abdominal pain, constipation and diarrhea.   Endocrine: Negative for cold intolerance and heat intolerance.   Genitourinary: Negative for dysuria and hematuria.   Musculoskeletal: Negative for joint swelling and neck stiffness.   Skin: Positive for wound. Negative for rash.   Neurological: Negative for dizziness, syncope, weakness and headaches.   Psychiatric/Behavioral: Negative for suicidal ideas.         Objective:        Physical Exam  Constitutional:       General: She is not in acute distress.     Appearance: She is well-developed. She is obese. She is not ill-appearing.   HENT:      Head: Normocephalic and atraumatic.   Eyes:      Conjunctiva/sclera: Conjunctivae normal.   Neck:      Musculoskeletal: Normal range of motion.   Cardiovascular:      Heart sounds: No gallop.    Pulmonary:      Effort: Pulmonary effort is normal. No respiratory distress.   Abdominal:      General: There is no distension.      Palpations: Abdomen is soft.      Tenderness: There is no rebound.    Musculoskeletal: Normal range of motion.         General: No tenderness.      Comments: Right plantar third MTP head wsith firm callous no fluctuance or discharge   Skin:     General: Skin is warm and dry.      Comments: Left first MTP joint with dorsal callous formation no fluctuance or expressible discharge. Using #15 scalpel blade callous was paired away from entire dorsal and medial MTP joint no bleeding or subcutaneous level reached.    Neurological:      Mental Status: She is alert and oriented to person, place, and time.      Cranial Nerves: No cranial nerve deficit.   Psychiatric:         Mood and Affect: Mood normal.         Behavior: Behavior normal.         Vitals:    08/06/20 0821   BP: 124/81   Pulse: 95   Resp: 17   Temp: 98.9 °F (37.2 °C)       Assessment:           ICD-10-CM ICD-9-CM   1. Type 2 diabetes mellitus with foot ulcer, with long-term current use of insulin  E11.621 250.80    L97.509 707.15    Z79.4 V58.67            Wound 07/23/20 Diabetic Ulcer Left medial Foot (Active)   07/23/20     Pre-existing: No   Primary Wound Type: Diabetic ulc   Side: Left   Orientation: medial   Location: Foot   Wound Number (optional):    Ankle-Brachial Index:    Pulses:    Removal Indication and Assessment:    Wound Outcome:    (Retired) Wound Type:    (Retired) Wound Length (cm):    (Retired) Wound Width (cm):    (Retired) Depth (cm):    Wound Description (Comments):    Removal Indications:    Wound Image   08/06/20 0900   Wound WDL WDL 08/06/20 0900   Dressing Appearance Intact 08/06/20 0900   Drainage Amount None 08/06/20 0900   Appearance Epithelialization;Dry 08/06/20 0900   Tissue loss description Not applicable 08/06/20 0900   Black (%), Wound Tissue Color 0 % 08/06/20 0900   Red (%), Wound Tissue Color 0 % 08/06/20 0900   Yellow (%), Wound Tissue Color 0 % 08/06/20 0900   Periwound Area Dry 08/06/20 0900   Wound Edges Callused 08/06/20 0900   Wound Length (cm) 0 cm 08/06/20 0900   Wound Width (cm) 0  cm 08/06/20 0900   Wound Depth (cm) 0 cm 08/06/20 0900   Wound Volume (cm^3) 0 cm^3 08/06/20 0900   Wound Surface Area (cm^2) 0 cm^2 08/06/20 0900   Tunneling (depth (cm)/location) 0 08/06/20 0900   Undermining (depth (cm)/location) 0 08/06/20 0900   Care Soap and water;Sterile normal saline;Wound cleanser 08/06/20 0900   Dressing Applied;Foam 08/06/20 0900   Off Loading Football dressing 08/06/20 0900   Dressing Change Due 08/13/20 08/06/20 0900            Wound 07/30/20 0857 Diabetic Ulcer Left plantar Foot (Active)   07/30/20 0857    Pre-existing:    Primary Wound Type: Diabetic ulc   Side: Left   Orientation: plantar   Location: Foot   Wound Number (optional):    Ankle-Brachial Index:    Pulses:    Removal Indication and Assessment:    Wound Outcome:    (Retired) Wound Type:    (Retired) Wound Length (cm):    (Retired) Wound Width (cm):    (Retired) Depth (cm):    Wound Description (Comments):    Removal Indications:    Wound WDL WDL 08/06/20 0900   Dressing Appearance Intact 08/06/20 0900   Drainage Amount None 08/06/20 0900   Appearance Epithelialization;Dry 08/06/20 0900   Tissue loss description Not applicable 08/06/20 0900   Black (%), Wound Tissue Color 0 % 08/06/20 0900   Red (%), Wound Tissue Color 0 % 08/06/20 0900   Yellow (%), Wound Tissue Color 0 % 08/06/20 0900   Periwound Area Dry 08/06/20 0900   Wound Edges Callused 08/06/20 0900   Wound Length (cm) 0 cm 08/06/20 0900   Wound Width (cm) 0 cm 08/06/20 0900   Wound Depth (cm) 0 cm 08/06/20 0900   Wound Volume (cm^3) 0 cm^3 08/06/20 0900   Wound Surface Area (cm^2) 0 cm^2 08/06/20 0900   Tunneling (depth (cm)/location) 0 08/06/20 0900   Undermining (depth (cm)/location) 0 08/06/20 0900   Care Soap and water;Sterile normal saline;Wound cleanser 08/06/20 0900   Dressing Applied;Foam 08/06/20 0900   Off Loading Football dressing 08/06/20 0900   Dressing Change Due 08/13/20 08/06/20 0900           Plan:            Wounds improving with offloading callous  paired today needs podiatry evaluation for toenail care and for insert prescription. Referral for this entered today. To establish with PCP for better glycemic control return one week to monitor foot for continued healing if no new wounds can plan to discharge next week    Orders Placed This Encounter   Procedures    Change dressing     jody foam long X2, football dressing (cast padding X3, coban) for security        No follow-ups on file.

## 2020-08-13 ENCOUNTER — TELEPHONE (OUTPATIENT)
Dept: WOUND CARE | Facility: HOSPITAL | Age: 55
End: 2020-08-13

## 2020-08-13 ENCOUNTER — HOSPITAL ENCOUNTER (OUTPATIENT)
Dept: WOUND CARE | Facility: HOSPITAL | Age: 55
Discharge: HOME | End: 2020-08-13
Attending: INTERNAL MEDICINE
Payer: MEDICAID

## 2020-08-13 VITALS
TEMPERATURE: 97 F | HEART RATE: 89 BPM | RESPIRATION RATE: 17 BRPM | SYSTOLIC BLOOD PRESSURE: 137 MMHG | DIASTOLIC BLOOD PRESSURE: 81 MMHG

## 2020-08-13 DIAGNOSIS — E11.621 TYPE 2 DIABETES MELLITUS WITH FOOT ULCER, WITH LONG-TERM CURRENT USE OF INSULIN: Primary | ICD-10-CM

## 2020-08-13 DIAGNOSIS — L97.509 TYPE 2 DIABETES MELLITUS WITH FOOT ULCER, WITH LONG-TERM CURRENT USE OF INSULIN: Primary | ICD-10-CM

## 2020-08-13 DIAGNOSIS — Z79.4 TYPE 2 DIABETES MELLITUS WITH FOOT ULCER, WITH LONG-TERM CURRENT USE OF INSULIN: Primary | ICD-10-CM

## 2020-08-13 PROCEDURE — 99213 OFFICE O/P EST LOW 20 MIN: CPT | Performed by: INTERNAL MEDICINE

## 2020-08-13 PROCEDURE — 99213 OFFICE O/P EST LOW 20 MIN: CPT | Mod: ,,, | Performed by: INTERNAL MEDICINE

## 2020-08-13 PROCEDURE — 99213 PR OFFICE/OUTPT VISIT, EST, LEVL III, 20-29 MIN: ICD-10-PCS | Mod: ,,, | Performed by: INTERNAL MEDICINE

## 2020-08-13 NOTE — PROGRESS NOTES
[facility]                Progress Note    Subjective:       Patient ID: Tracey Campbell is a 54 y.o. female.    Chief Complaint: Non-healing Wound Follow Up and Diabetic Foot Ulcer    08/13/2020: F/U visit. Wound is healed. No debridement needed. Referred to podiatry clinic for diabetic shoes.    Wound is closed no drainage completed antibiotics reports improvement in blood glucose with increased insulin. Has establish with PCP at [facility]      Review of Systems   Constitutional: Negative for activity change, appetite change, fatigue, fever and unexpected weight change.   HENT: Negative for ear pain, rhinorrhea and sore throat.    Eyes: Negative for discharge and visual disturbance.   Respiratory: Negative for chest tightness, shortness of breath and wheezing.    Cardiovascular: Negative for chest pain, palpitations and leg swelling.   Gastrointestinal: Negative for abdominal pain, constipation and diarrhea.   Endocrine: Negative for cold intolerance and heat intolerance.   Genitourinary: Negative for dysuria and hematuria.   Musculoskeletal: Negative for joint swelling and neck stiffness.   Skin: Negative for rash.   Neurological: Negative for dizziness, syncope, weakness and headaches.   Psychiatric/Behavioral: Negative for suicidal ideas.         Objective:        Physical Exam  Constitutional:       General: She is not in acute distress.     Appearance: Normal appearance. She is obese. She is not ill-appearing.   Eyes:      General: No scleral icterus.  Pulmonary:      Effort: Pulmonary effort is normal. No respiratory distress.   Abdominal:      General: There is no distension.      Palpations: Abdomen is soft.   Skin:     Comments: Left medial MTP intake some callous but no fluctuance or expressible discharge plantar surface intact without drainage or erythema   Neurological:      General: No focal deficit present.      Mental Status: She is alert and oriented to  person, place, and time.   Psychiatric:         Mood and Affect: Mood normal.         Behavior: Behavior normal.         Vitals:    08/13/20 0807   BP: 137/81   Pulse: 89   Resp: 17   Temp: 96.8 °F (36 °C)       Assessment:           ICD-10-CM ICD-9-CM   1. Type 2 diabetes mellitus with foot ulcer, with long-term current use of insulin  E11.621 250.80    L97.509 707.15    Z79.4 V58.67       [REMOVED]      Wound 07/23/20 Diabetic Ulcer Left medial Foot (Removed)   07/23/20     Pre-existing: No   Primary Wound Type: Diabetic ul   Side: Left   Orientation: medial   Location: Foot   Wound Number (optional):    Ankle-Brachial Index:    Pulses:    Removal Indication and Assessment: closed/resurfaced   Wound Outcome: Healed   (Retired) Wound Type:    (Retired) Wound Length (cm):    (Retired) Wound Width (cm):    (Retired) Depth (cm):    Wound Description (Comments):    Removal Indications:    Removed 08/13/20 0814   Wound Image   08/13/20 0800   Wound WDL WDL 08/13/20 0800   Dressing Appearance Intact 08/13/20 0800   Drainage Amount None 08/13/20 0800   Appearance Dry;Epithelialization 08/13/20 0800   Tissue loss description Not applicable 08/13/20 0800   Black (%), Wound Tissue Color 0 % 08/13/20 0800   Red (%), Wound Tissue Color 0 % 08/13/20 0800   Yellow (%), Wound Tissue Color 0 % 08/13/20 0800   Periwound Area Dry;Intact 08/13/20 0800   Wound Edges Other (see comments) 08/13/20 0800   Wound Length (cm) 0 cm 08/13/20 0800   Wound Width (cm) 0 cm 08/13/20 0800   Wound Depth (cm) 0 cm 08/13/20 0800   Wound Volume (cm^3) 0 cm^3 08/13/20 0800   Wound Surface Area (cm^2) 0 cm^2 08/13/20 0800   Tunneling (depth (cm)/location) 0 08/13/20 0800   Undermining (depth (cm)/location) 0 08/13/20 0800   Care Soap and water;Sterile normal saline;Wound cleanser 08/13/20 0800           Plan:            Wound has healed discharge from wound care clinic needs podiatry evaluation for new insoles and nail care. Daily moisturizer to feet  no bare feet    No orders of the defined types were placed in this encounter.       No follow-ups on file.

## 2020-11-12 ENCOUNTER — HOSPITAL ENCOUNTER (OUTPATIENT)
Dept: RADIOLOGY | Facility: HOSPITAL | Age: 55
Discharge: HOME | End: 2020-11-12
Attending: PODIATRIST
Payer: MEDICAID

## 2020-11-12 ENCOUNTER — OFFICE VISIT (OUTPATIENT)
Dept: PODIATRY | Facility: CLINIC | Age: 55
End: 2020-11-12
Payer: MEDICAID

## 2020-11-12 VITALS — HEIGHT: 67 IN | WEIGHT: 293 LBS | BODY MASS INDEX: 45.99 KG/M2

## 2020-11-12 DIAGNOSIS — L84 PRE-ULCERATIVE CALLUSES: ICD-10-CM

## 2020-11-12 DIAGNOSIS — E11.621 TYPE 2 DIABETES MELLITUS WITH FOOT ULCER, WITH LONG-TERM CURRENT USE OF INSULIN: ICD-10-CM

## 2020-11-12 DIAGNOSIS — M25.571 CHRONIC PAIN OF RIGHT ANKLE: ICD-10-CM

## 2020-11-12 DIAGNOSIS — Z79.4 TYPE 2 DIABETES MELLITUS WITH FOOT ULCER, WITH LONG-TERM CURRENT USE OF INSULIN: Primary | ICD-10-CM

## 2020-11-12 DIAGNOSIS — L97.509 TYPE 2 DIABETES MELLITUS WITH FOOT ULCER, WITH LONG-TERM CURRENT USE OF INSULIN: Primary | ICD-10-CM

## 2020-11-12 DIAGNOSIS — G89.29 CHRONIC PAIN OF RIGHT ANKLE: ICD-10-CM

## 2020-11-12 DIAGNOSIS — L97.509 TYPE 2 DIABETES MELLITUS WITH FOOT ULCER, WITH LONG-TERM CURRENT USE OF INSULIN: ICD-10-CM

## 2020-11-12 DIAGNOSIS — Z79.4 TYPE 2 DIABETES MELLITUS WITH FOOT ULCER, WITH LONG-TERM CURRENT USE OF INSULIN: ICD-10-CM

## 2020-11-12 DIAGNOSIS — E11.621 TYPE 2 DIABETES MELLITUS WITH FOOT ULCER, WITH LONG-TERM CURRENT USE OF INSULIN: Primary | ICD-10-CM

## 2020-11-12 PROCEDURE — 99215 OFFICE O/P EST HI 40 MIN: CPT | Mod: PBBFAC,25,PO | Performed by: PODIATRIST

## 2020-11-12 PROCEDURE — 73610 X-RAY EXAM OF ANKLE: CPT | Mod: 26,RT,, | Performed by: RADIOLOGY

## 2020-11-12 PROCEDURE — 73610 X-RAY EXAM OF ANKLE: CPT | Mod: TC,FY,PO,RT

## 2020-11-12 PROCEDURE — 73630 X-RAY EXAM OF FOOT: CPT | Mod: 26,RT,, | Performed by: RADIOLOGY

## 2020-11-12 PROCEDURE — 73610 XR ANKLE COMPLETE 3 VIEW RIGHT: ICD-10-PCS | Mod: 26,RT,, | Performed by: RADIOLOGY

## 2020-11-12 PROCEDURE — 99203 OFFICE O/P NEW LOW 30 MIN: CPT | Mod: S$PBB,,, | Performed by: PODIATRIST

## 2020-11-12 PROCEDURE — 73630 XR FOOT COMPLETE 3 VIEW RIGHT: ICD-10-PCS | Mod: 26,RT,, | Performed by: RADIOLOGY

## 2020-11-12 PROCEDURE — 99999 PR PBB SHADOW E&M-EST. PATIENT-LVL V: ICD-10-PCS | Mod: PBBFAC,,, | Performed by: PODIATRIST

## 2020-11-12 PROCEDURE — 73630 X-RAY EXAM OF FOOT: CPT | Mod: TC,FY,PO,RT

## 2020-11-12 PROCEDURE — 99203 PR OFFICE/OUTPT VISIT, NEW, LEVL III, 30-44 MIN: ICD-10-PCS | Mod: S$PBB,,, | Performed by: PODIATRIST

## 2020-11-12 PROCEDURE — 99999 PR PBB SHADOW E&M-EST. PATIENT-LVL V: CPT | Mod: PBBFAC,,, | Performed by: PODIATRIST

## 2020-11-12 NOTE — LETTER
November 13, 2020      Riley Campbell MD  [address] [facility]  [place] [address]           [facility]  [address] [facility]  [place] [address]  Phone: [phone]          Patient: Tracey Campbell   MR Number: [id number]   YOB: 1965   Date of Visit: 11/12/2020       Dear Dr. Riley Campbell:    Thank you for referring Tracey Campbell to me for evaluation. Attached you will find relevant portions of my assessment and plan of care.    If you have questions, please do not hesitate to call me. I look forward to following Tracey Campbell along with you.    Sincerely,    Maria De Jesus Jackson, [place]    Enclosure  CC:  No Recipients    If you would like to receive this communication electronically, please contact [email or web address] or [phone] to request more information on [employer] Link access.    For providers and/or their staff who would like to refer a patient to [facility], please contact us through our one-stop-shop provider referral line, [facility] , at [phone].    If you feel you have received this communication in error or would no longer like to receive these types of communications, please e-mail [email or web address]

## 2020-11-12 NOTE — PATIENT INSTRUCTIONS
Recommend lotions: eucerin, eucerin for diabetics, aquaphor, A&D ointment, gold bond for diabetics, sween, [place]'s Bees all purpose baby ointment,  urea 40 with aloe (found on amazon.com)    Shoe recommendations: (try 6pm.com, zappos.com , nordstromrack.Bosse Tools, or shoes.Bosse Tools for discounted prices) you can visit DSW shoes in [place]  or [employer] in the [facility] (there are also several shoe brand outlets in the [facility])    Asics (GT 2000 or gel foundations), new balance stability type shoes, saucony (stabil c3),  Bowens (GTS or Beast or transcend), propet (tennis shoe)    Sofft Brand or axxiom (women) Miryam&Fernando (men), clarks, crocs, aerosoles, naturalizers, SAS, ecco, born, najma cruz, rockports (dress shoes)    Vionic, burkenstocks, fitflops, propet (sandals)  Nike comfort thong sandals, crocs, propet (house shoes)    Nail Home remedy:  Vicks Vapor rub to nails for easier manageability    Diabetes: Inspecting Your Feet  Diabetes increases your chances of developing foot problems. So inspect your feet every day. This helps you find small skin irritations before they become serious infections. If you have trouble seeing the bottoms of your feet, use a mirror or ask a family member or friend to help.     Pressure spots on the bottom of the foot are common areas where problems develop.   How to check your feet  Below are tips to help you look for foot problems. Try to check your feet at the same time each day, such as when you get out of bed in the morning:  · Check the top of each foot. The tops of toes, back of the heel, and outer edge of the foot can get a lot of rubbing from poor-fitting shoes.  · Check the bottom of each foot. Daily wear and tear often leads to problems at pressure spots.  · Check the toes and nails. Fungal infections often occur between toes. Toenail problems can also be a sign of fungal infections or lead to breaks in the skin.  · Check your shoes, too. Loose objects inside a shoe can  injure the foot. Use your hand to feel inside your shoes for things like elmer, loose stitching, or rough areas that could irritate your skin.  Warning signs  Look for any color changes in the foot. Redness with streaks can signal a severe infection, which needs immediate medical attention. Tell your doctor right away if you have any of these problems:  · Swelling, sometimes with color changes, may be a sign of poor blood flow or infection. Symptoms include tenderness and an increase in the size of your foot.  · Warm or hot areas on your feet may be signs of infection. A foot that is cold may not be getting enough blood.  · Sensations such as burning, tingling, or pins and needles can be signs of a problem. Also check for areas that may be numb.  · Hot spots are caused by friction or pressure. Look for hot spots in areas that get a lot of rubbing. Hot spots can turn into blisters, calluses, or sores.  · Cracks and sores are caused by dry or irritated skin. They are a sign that the skin is breaking down, which can lead to infection.  · Toenail problems to watch for include nails growing into the skin (ingrown toenail) and causing redness or pain. Thick, yellow, or discolored nails can signal a fungal infection.  · Drainage and odor can develop from untreated sores and ulcers. Call your doctor right away if you notice white or yellow drainage, bleeding, or unpleasant odor.   © 7887-2133 [employer]. [address] [place] [address]. All rights reserved. This information is not intended as a substitute for professional medical care. Always follow your healthcare professional's instructions.        Step-by-Step:  Inspecting Your Feet (Diabetes)    Date Last Reviewed: 10/1/2016  © 5639-0072 [employer]. [address] [place] [address]. All rights reserved. This information is not intended as a substitute for professional medical care. Always follow your healthcare  professional's instructions.

## 2020-11-13 NOTE — PROGRESS NOTES
Subjective:      Patient ID: Tracey Campbell is a 54 y.o. female.    Chief Complaint: Diabetes Mellitus (Dr Pruitt PCP), Diabetic Foot Exam, Foot Ulcer (prev on left foot), and Callouses (left foot on top of prev ulcer)    Tracey is a 54 y.o. female who presents to the clinic upon referral from Dr. Campbell  for evaluation and treatment of diabetic feet. Tracey has a past medical history of Asthma, Depression, Diabetes mellitus, type 2, Hyperlipidemia, Hypertension, and Neuromuscular disorder. Presents for diabetic foot risk assessment. Reports h/o left foot ulceration, OM with PICC line treated in [place]. Reports noticing development of callus left plantar foot with discoloration. Reports starting recent job at [employer] where she is on her feet for many hours. Also reports right ankle pain and swelling x several weeks. Recently discharged from wound care. Reports went to  DM shoes however they did not fit and is waiting on new pair.       PCP: Yumiko Pruitt MD    Date Last Seen by PCP: none found     Current shoe gear: Tennis shoes    Hemoglobin A1C   Date Value Ref Range Status   07/23/2020 12.7 (H) 4.0 - 5.6 % Final     Comment:     ADA Screening Guidelines:  5.7-6.4%  Consistent with prediabetes  >or=6.5%  Consistent with diabetes  High levels of fetal hemoglobin interfere with the HbA1C  assay. Heterozygous hemoglobin variants (HbS, HgC, etc)do  not significantly interfere with this assay.   However, presence of multiple variants may affect accuracy.             Review of Systems   Constitution: Negative for chills.   Cardiovascular: Negative for chest pain and claudication.   Respiratory: Negative for cough.    Skin: Positive for color change, dry skin and nail changes.   Musculoskeletal: Positive for joint pain.   Gastrointestinal: Negative for nausea.   Neurological: Positive for paresthesias. Negative for numbness.   Psychiatric/Behavioral: The patient is not nervous/anxious.            Objective:       Physical Exam  Constitutional:       Appearance: She is well-developed.      Comments: Oriented to time, place, and person.   Cardiovascular:      Comments: DP and PT pulses are palpable bilaterally. 3 sec capillary refill time and toes and feet are warm to touch proximally .  There is  hair growth on the feet and toes b/l. There is no edema b/l. No spider veins or varicosities present b/l.     Musculoskeletal:      Comments: Equinus noted b/l ankles with < 10 deg DF noted. MMT 5/5 in DF/PF/Inv/Ev resistance with no reproduction of pain in any direction. Passive range of motion of ankle and pedal joints is painless b/l.    Right lateral ankle pain ATFL.    Feet:      Right foot:      Skin integrity: No callus or dry skin.      Left foot:      Skin integrity: No callus or dry skin.   Lymphadenopathy:      Comments: Negative lymphadenopathy bilateral popliteal fossa and tarsal tunnel.   Skin:     Comments: No open lesions, lacerations or wounds noted.Interdigital spaces clean, dry and intact b/l. No erythema noted to b/l foot.    Pre ulcerative callus left plantar foot and right medial hallux IPJ.        Neurological:      Mental Status: She is alert.      Comments: Light touch, proprioception, and sharp/dull sensation are all intact bilaterally. Protective threshold with the [place]-Wienstein monofilament is intact bilaterally.    Psychiatric:         Behavior: Behavior is cooperative.       11/12/20:    Pre callus trimming left foot         Post callus trimming               Assessment:       Encounter Diagnoses   Name Primary?    Type 2 diabetes mellitus with foot ulcer, with long-term current use of insulin Yes    Pre-ulcerative calluses     Chronic pain of right ankle          Plan:       Tracey was seen today for diabetes mellitus, diabetic foot exam, foot ulcer and callouses.    Diagnoses and all orders for this visit:    Type 2 diabetes mellitus with foot ulcer, with long-term current use of insulin  -      Ambulatory referral/consult to Podiatry  -     X-Ray Foot Complete Right; Future  -     X-Ray Ankle Complete Right; Future    Pre-ulcerative calluses  -     X-Ray Foot Complete Right; Future  -     X-Ray Ankle Complete Right; Future    Chronic pain of right ankle  -     X-Ray Foot Complete Right; Future  -     X-Ray Ankle Complete Right; Future      I counseled the patient on her conditions, their implications and medical management.      - Shoe inspection. Diabetic Foot Education. Patient reminded of the importance of good nutrition and blood sugar control to help prevent podiatric complications of diabetes. Patient instructed on proper foot hygeine. We discussed wearing proper shoe gear, daily foot inspections, never walking without protective shoe gear, caution putting sharp instruments to feet     - Discussed DM foot care:  Wear comfortable, proper fitting shoes. Wash feet daily. Dry well. After drying, apply moisturizer to feet (no lotion to webspaces). Inspect feet daily for skin breaks, blisters, swelling, or redness. Wear cotton socks (preferably white)  Change socks every day. Do NOT walk barefoot. Do NOT use heating pads or warm/hot water soaks     Right foot xray to assess underlying deformity and for underlying osseous pathology.     Note given for work       - With patient's permission, nails were aggressively reduced and debrided x 10 to their soft tissue attachment mechanically and with electric , removing all offending nail and debris. Patient relates relief following the procedure. He will continue to monitor the areas daily, inspect his feet, wear protective shoe gear when ambulatory, moisturizer to maintain skin integrity and follow in this office in approximately 2-3 months, sooner p.r.n.   - After cleansing the area w/ alcohol prep pad the above mentioned hyperkeratosis was trimmed utilizing No 15 scapel, to a smooth base with out incident. Patient tolerated this well      Dispensed,  fitted and gait trained with ankle brace. Instructed to wear with supportive shoe at all times when placing weight on the foot.    Instructed to  DM shoes.     F/u 9 weeks.

## 2021-01-07 ENCOUNTER — TELEPHONE (OUTPATIENT)
Dept: PODIATRY | Facility: CLINIC | Age: 56
End: 2021-01-07

## 2021-01-08 ENCOUNTER — OFFICE VISIT (OUTPATIENT)
Dept: URGENT CARE | Facility: CLINIC | Age: 56
End: 2021-01-08
Payer: MEDICAID

## 2021-01-08 ENCOUNTER — HOSPITAL ENCOUNTER (EMERGENCY)
Facility: HOSPITAL | Age: 56
Discharge: HOME | End: 2021-01-09
Attending: EMERGENCY MEDICINE
Payer: MEDICAID

## 2021-01-08 VITALS
HEART RATE: 110 BPM | HEIGHT: 67 IN | TEMPERATURE: 96 F | RESPIRATION RATE: 18 BRPM | WEIGHT: 293 LBS | DIASTOLIC BLOOD PRESSURE: 84 MMHG | OXYGEN SATURATION: 96 % | SYSTOLIC BLOOD PRESSURE: 146 MMHG | BODY MASS INDEX: 45.99 KG/M2

## 2021-01-08 DIAGNOSIS — R59.0 INGUINAL LYMPHADENOPATHY: ICD-10-CM

## 2021-01-08 DIAGNOSIS — M79.89 PAIN AND SWELLING OF LEFT LOWER LEG: Primary | ICD-10-CM

## 2021-01-08 DIAGNOSIS — M79.662 PAIN OF LEFT CALF: ICD-10-CM

## 2021-01-08 DIAGNOSIS — M79.662 PAIN AND SWELLING OF LEFT LOWER LEG: Primary | ICD-10-CM

## 2021-01-08 DIAGNOSIS — M79.672 LEFT FOOT PAIN: ICD-10-CM

## 2021-01-08 DIAGNOSIS — M79.89 LEFT LEG SWELLING: Primary | ICD-10-CM

## 2021-01-08 DIAGNOSIS — N30.90 CYSTITIS: ICD-10-CM

## 2021-01-08 LAB
ALBUMIN SERPL BCP-MCNC: 3.2 G/DL (ref 3.5–5.2)
ALP SERPL-CCNC: 150 U/L (ref 55–135)
ALT SERPL W/O P-5'-P-CCNC: 19 U/L (ref 10–44)
ANION GAP SERPL CALC-SCNC: 9 MMOL/L (ref 8–16)
AST SERPL-CCNC: 19 U/L (ref 10–40)
BACTERIA #/AREA URNS HPF: ABNORMAL /HPF
BASOPHILS # BLD AUTO: 0.04 K/UL (ref 0–0.2)
BASOPHILS NFR BLD: 0.5 % (ref 0–1.9)
BILIRUB SERPL-MCNC: 0.3 MG/DL (ref 0.1–1)
BILIRUB UR QL STRIP: NEGATIVE
BNP SERPL-MCNC: 72 PG/ML (ref 0–99)
BUN SERPL-MCNC: 21 MG/DL (ref 6–20)
CALCIUM SERPL-MCNC: 9.6 MG/DL (ref 8.7–10.5)
CHLORIDE SERPL-SCNC: 103 MMOL/L (ref 95–110)
CLARITY UR: CLEAR
CO2 SERPL-SCNC: 27 MMOL/L (ref 23–29)
COLOR UR: YELLOW
CREAT SERPL-MCNC: 1.2 MG/DL (ref 0.5–1.4)
CRP SERPL-MCNC: 86.4 MG/L (ref 0–8.2)
CTP QC/QA: YES
DIFFERENTIAL METHOD: ABNORMAL
EOSINOPHIL # BLD AUTO: 0.1 K/UL (ref 0–0.5)
EOSINOPHIL NFR BLD: 1.5 % (ref 0–8)
EPITH CASTS #/AREA URNS LPF: 0 /LPF
ERYTHROCYTE [DISTWIDTH] IN BLOOD BY AUTOMATED COUNT: 14.2 % (ref 11.5–14.5)
ERYTHROCYTE [SEDIMENTATION RATE] IN BLOOD BY WESTERGREN METHOD: 89 MM/HR (ref 0–20)
EST. GFR  (AFRICAN AMERICAN): 60 ML/MIN/1.73 M^2
EST. GFR  (NON AFRICAN AMERICAN): 52 ML/MIN/1.73 M^2
GLUCOSE SERPL-MCNC: 119 MG/DL (ref 70–110)
GLUCOSE UR QL STRIP: ABNORMAL
HCT VFR BLD AUTO: 35.4 % (ref 37–48.5)
HGB BLD-MCNC: 10.9 G/DL (ref 12–16)
HGB UR QL STRIP: ABNORMAL
HYALINE CASTS #/AREA URNS LPF: 0 /LPF
IMM GRANULOCYTES # BLD AUTO: 0.02 K/UL (ref 0–0.04)
IMM GRANULOCYTES NFR BLD AUTO: 0.3 % (ref 0–0.5)
KETONES UR QL STRIP: NEGATIVE
LEUKOCYTE ESTERASE UR QL STRIP: ABNORMAL
LYMPHOCYTES # BLD AUTO: 1.9 K/UL (ref 1–4.8)
LYMPHOCYTES NFR BLD: 25.7 % (ref 18–48)
MCH RBC QN AUTO: 25.1 PG (ref 27–31)
MCHC RBC AUTO-ENTMCNC: 30.8 G/DL (ref 32–36)
MCV RBC AUTO: 81 FL (ref 82–98)
MICROSCOPIC COMMENT: ABNORMAL
MONOCYTES # BLD AUTO: 0.5 K/UL (ref 0.3–1)
MONOCYTES NFR BLD: 6.1 % (ref 4–15)
NEUTROPHILS # BLD AUTO: 4.9 K/UL (ref 1.8–7.7)
NEUTROPHILS NFR BLD: 65.9 % (ref 38–73)
NITRITE UR QL STRIP: NEGATIVE
NRBC BLD-RTO: 0 /100 WBC
PH UR STRIP: 6 [PH] (ref 5–8)
PLATELET # BLD AUTO: 271 K/UL (ref 150–350)
PMV BLD AUTO: 12.5 FL (ref 9.2–12.9)
POCT GLUCOSE: 152 MG/DL (ref 70–110)
POTASSIUM SERPL-SCNC: 3.8 MMOL/L (ref 3.5–5.1)
PROT SERPL-MCNC: 8.3 G/DL (ref 6–8.4)
PROT UR QL STRIP: ABNORMAL
RBC # BLD AUTO: 4.35 M/UL (ref 4–5.4)
RBC #/AREA URNS HPF: 5 /HPF (ref 0–4)
SARS-COV-2 RDRP RESP QL NAA+PROBE: NEGATIVE
SODIUM SERPL-SCNC: 139 MMOL/L (ref 136–145)
SP GR UR STRIP: 1.02 (ref 1–1.03)
URATE SERPL-MCNC: 5.8 MG/DL (ref 2.4–5.7)
URN SPEC COLLECT METH UR: ABNORMAL
UROBILINOGEN UR STRIP-ACNC: NEGATIVE EU/DL
WBC # BLD AUTO: 7.36 K/UL (ref 3.9–12.7)
WBC #/AREA URNS HPF: 67 /HPF (ref 0–5)
YEAST URNS QL MICRO: ABNORMAL

## 2021-01-08 PROCEDURE — 87491 CHLMYD TRACH DNA AMP PROBE: CPT

## 2021-01-08 PROCEDURE — 85025 COMPLETE CBC W/AUTO DIFF WBC: CPT

## 2021-01-08 PROCEDURE — 86140 C-REACTIVE PROTEIN: CPT

## 2021-01-08 PROCEDURE — 99214 OFFICE O/P EST MOD 30 MIN: CPT | Mod: S$GLB,,, | Performed by: PHYSICIAN ASSISTANT

## 2021-01-08 PROCEDURE — 87591 N.GONORRHOEAE DNA AMP PROB: CPT

## 2021-01-08 PROCEDURE — 82962 GLUCOSE BLOOD TEST: CPT

## 2021-01-08 PROCEDURE — 84550 ASSAY OF BLOOD/URIC ACID: CPT

## 2021-01-08 PROCEDURE — 99285 EMERGENCY DEPT VISIT HI MDM: CPT | Mod: 25

## 2021-01-08 PROCEDURE — 85652 RBC SED RATE AUTOMATED: CPT

## 2021-01-08 PROCEDURE — U0002 COVID-19 LAB TEST NON-CDC: HCPCS | Performed by: PHYSICIAN ASSISTANT

## 2021-01-08 PROCEDURE — 87086 URINE CULTURE/COLONY COUNT: CPT

## 2021-01-08 PROCEDURE — 80053 COMPREHEN METABOLIC PANEL: CPT

## 2021-01-08 PROCEDURE — 25000003 PHARM REV CODE 250: Performed by: PHYSICIAN ASSISTANT

## 2021-01-08 PROCEDURE — 99214 PR OFFICE/OUTPT VISIT, EST, LEVL IV, 30-39 MIN: ICD-10-PCS | Mod: S$GLB,,, | Performed by: PHYSICIAN ASSISTANT

## 2021-01-08 PROCEDURE — 81000 URINALYSIS NONAUTO W/SCOPE: CPT

## 2021-01-08 PROCEDURE — 83880 ASSAY OF NATRIURETIC PEPTIDE: CPT

## 2021-01-08 RX ORDER — HYDROCODONE BITARTRATE AND ACETAMINOPHEN 5; 325 MG/1; MG/1
1 TABLET ORAL
Status: COMPLETED | OUTPATIENT
Start: 2021-01-08 | End: 2021-01-08

## 2021-01-08 RX ORDER — CEPHALEXIN 500 MG/1
500 CAPSULE ORAL
Status: COMPLETED | OUTPATIENT
Start: 2021-01-08 | End: 2021-01-08

## 2021-01-08 RX ADMIN — HYDROCODONE BITARTRATE AND ACETAMINOPHEN 1 TABLET: 5; 325 TABLET ORAL at 10:01

## 2021-01-08 RX ADMIN — CEPHALEXIN 500 MG: 500 CAPSULE ORAL at 10:01

## 2021-01-09 VITALS
TEMPERATURE: 98 F | SYSTOLIC BLOOD PRESSURE: 123 MMHG | DIASTOLIC BLOOD PRESSURE: 86 MMHG | OXYGEN SATURATION: 95 % | HEART RATE: 92 BPM | WEIGHT: 293 LBS | HEIGHT: 67 IN | BODY MASS INDEX: 45.99 KG/M2 | RESPIRATION RATE: 20 BRPM

## 2021-01-09 RX ORDER — HYDROCODONE BITARTRATE AND ACETAMINOPHEN 5; 325 MG/1; MG/1
1 TABLET ORAL EVERY 6 HOURS PRN
Qty: 10 TABLET | Refills: 0 | Status: SHIPPED | OUTPATIENT
Start: 2021-01-09

## 2021-01-09 RX ORDER — IBUPROFEN 600 MG/1
600 TABLET ORAL EVERY 6 HOURS PRN
Qty: 20 TABLET | Refills: 0 | Status: SHIPPED | OUTPATIENT
Start: 2021-01-09

## 2021-01-09 RX ORDER — CEPHALEXIN 500 MG/1
500 CAPSULE ORAL 4 TIMES DAILY
Qty: 40 CAPSULE | Refills: 0 | Status: SHIPPED | OUTPATIENT
Start: 2021-01-09 | End: 2021-01-19

## 2021-01-09 RX ORDER — HYDROCHLOROTHIAZIDE 25 MG/1
25 TABLET ORAL DAILY
Qty: 30 TABLET | Refills: 0 | Status: SHIPPED | OUTPATIENT
Start: 2021-01-09 | End: 2022-01-09

## 2021-01-09 RX ORDER — ONDANSETRON 4 MG/1
4 TABLET, ORALLY DISINTEGRATING ORAL EVERY 6 HOURS PRN
Qty: 20 TABLET | Refills: 0 | Status: SHIPPED | OUTPATIENT
Start: 2021-01-09

## 2021-01-10 LAB — BACTERIA UR CULT: NORMAL

## 2021-01-11 ENCOUNTER — OFFICE VISIT (OUTPATIENT)
Dept: PODIATRY | Facility: CLINIC | Age: 56
End: 2021-01-11
Payer: MEDICAID

## 2021-01-11 VITALS — BODY MASS INDEX: 45.99 KG/M2 | WEIGHT: 293 LBS | HEIGHT: 67 IN

## 2021-01-11 DIAGNOSIS — M79.672 LEFT FOOT PAIN: ICD-10-CM

## 2021-01-11 DIAGNOSIS — L97.509 TYPE 2 DIABETES MELLITUS WITH FOOT ULCER, WITH LONG-TERM CURRENT USE OF INSULIN: Primary | ICD-10-CM

## 2021-01-11 DIAGNOSIS — M10.9 GOUT, UNSPECIFIED CAUSE, UNSPECIFIED CHRONICITY, UNSPECIFIED SITE: ICD-10-CM

## 2021-01-11 DIAGNOSIS — Z79.4 TYPE 2 DIABETES MELLITUS WITH FOOT ULCER, WITH LONG-TERM CURRENT USE OF INSULIN: Primary | ICD-10-CM

## 2021-01-11 DIAGNOSIS — E11.621 TYPE 2 DIABETES MELLITUS WITH FOOT ULCER, WITH LONG-TERM CURRENT USE OF INSULIN: Primary | ICD-10-CM

## 2021-01-11 LAB
C TRACH DNA SPEC QL NAA+PROBE: NOT DETECTED
N GONORRHOEA DNA SPEC QL NAA+PROBE: NOT DETECTED

## 2021-01-11 PROCEDURE — 99215 OFFICE O/P EST HI 40 MIN: CPT | Mod: PBBFAC,PO | Performed by: PODIATRIST

## 2021-01-11 PROCEDURE — 99999 PR PBB SHADOW E&M-EST. PATIENT-LVL V: CPT | Mod: PBBFAC,,, | Performed by: PODIATRIST

## 2021-01-11 PROCEDURE — 99213 OFFICE O/P EST LOW 20 MIN: CPT | Mod: S$PBB,,, | Performed by: PODIATRIST

## 2021-01-11 PROCEDURE — 99213 PR OFFICE/OUTPT VISIT, EST, LEVL III, 20-29 MIN: ICD-10-PCS | Mod: S$PBB,,, | Performed by: PODIATRIST

## 2021-01-11 PROCEDURE — 99999 PR PBB SHADOW E&M-EST. PATIENT-LVL V: ICD-10-PCS | Mod: PBBFAC,,, | Performed by: PODIATRIST

## 2021-01-11 RX ORDER — COLCHICINE 0.6 MG/1
0.6 TABLET ORAL DAILY
Qty: 7 TABLET | Refills: 0 | Status: SHIPPED | OUTPATIENT
Start: 2021-01-11 | End: 2022-01-11

## 2021-01-14 ENCOUNTER — TELEPHONE (OUTPATIENT)
Dept: ORTHOPEDICS | Facility: CLINIC | Age: 56
End: 2021-01-14

## 2021-02-02 ENCOUNTER — CLINICAL SUPPORT (OUTPATIENT)
Dept: REHABILITATION | Facility: HOSPITAL | Age: 56
End: 2021-02-02
Attending: PODIATRIST
Payer: MEDICAID

## 2021-02-02 DIAGNOSIS — R29.898 DECREASED STRENGTH OF LOWER EXTREMITY: ICD-10-CM

## 2021-02-02 DIAGNOSIS — M62.89 MUSCLE TIGHTNESS: ICD-10-CM

## 2021-02-02 DIAGNOSIS — R26.2 DIFFICULTY WALKING: ICD-10-CM

## 2021-02-02 DIAGNOSIS — M79.672 LEFT FOOT PAIN: ICD-10-CM

## 2021-02-02 PROCEDURE — 97162 PT EVAL MOD COMPLEX 30 MIN: CPT | Mod: PN

## 2021-04-23 ENCOUNTER — OFFICE VISIT (OUTPATIENT)
Dept: URGENT CARE | Facility: CLINIC | Age: 56
End: 2021-04-23
Payer: MEDICAID

## 2021-04-23 VITALS
TEMPERATURE: 99 F | WEIGHT: 293 LBS | SYSTOLIC BLOOD PRESSURE: 117 MMHG | OXYGEN SATURATION: 96 % | DIASTOLIC BLOOD PRESSURE: 79 MMHG | HEART RATE: 96 BPM | BODY MASS INDEX: 45.99 KG/M2 | HEIGHT: 67 IN | RESPIRATION RATE: 18 BRPM

## 2021-04-23 DIAGNOSIS — R81 GLUCOSURIA: ICD-10-CM

## 2021-04-23 DIAGNOSIS — B37.31 YEAST VAGINITIS: Primary | ICD-10-CM

## 2021-04-23 LAB
BILIRUB UR QL STRIP: NEGATIVE
GLUCOSE UR QL STRIP: POSITIVE
KETONES UR QL STRIP: POSITIVE
LEUKOCYTE ESTERASE UR QL STRIP: NEGATIVE
PH, POC UA: 5 (ref 5–8)
POC BLOOD, URINE: NEGATIVE
POC NITRATES, URINE: NEGATIVE
PROT UR QL STRIP: POSITIVE
SP GR UR STRIP: 1.02 (ref 1–1.03)
UROBILINOGEN UR STRIP-ACNC: ABNORMAL (ref 0.1–1.1)

## 2021-04-23 PROCEDURE — 81003 URINALYSIS AUTO W/O SCOPE: CPT | Mod: QW,S$GLB,, | Performed by: PHYSICIAN ASSISTANT

## 2021-04-23 PROCEDURE — 99214 OFFICE O/P EST MOD 30 MIN: CPT | Mod: 25,S$GLB,, | Performed by: PHYSICIAN ASSISTANT

## 2021-04-23 PROCEDURE — 99214 PR OFFICE/OUTPT VISIT, EST, LEVL IV, 30-39 MIN: ICD-10-PCS | Mod: 25,S$GLB,, | Performed by: PHYSICIAN ASSISTANT

## 2021-04-23 PROCEDURE — 81003 POCT URINALYSIS, DIPSTICK, AUTOMATED, W/O SCOPE: ICD-10-PCS | Mod: QW,S$GLB,, | Performed by: PHYSICIAN ASSISTANT

## 2021-04-23 RX ORDER — FLUCONAZOLE 200 MG/1
200 TABLET ORAL ONCE
Qty: 2 TABLET | Refills: 0 | Status: SHIPPED | OUTPATIENT
Start: 2021-04-23 | End: 2021-04-23

## 2021-05-03 ENCOUNTER — TELEPHONE (OUTPATIENT)
Dept: URGENT CARE | Facility: CLINIC | Age: 56
End: 2021-05-03

## 2021-05-12 ENCOUNTER — OFFICE VISIT (OUTPATIENT)
Dept: PODIATRY | Facility: CLINIC | Age: 56
End: 2021-05-12
Payer: MEDICAID

## 2021-05-12 ENCOUNTER — HOSPITAL ENCOUNTER (OUTPATIENT)
Dept: RADIOLOGY | Facility: HOSPITAL | Age: 56
Discharge: HOME | End: 2021-05-12
Attending: PODIATRIST
Payer: MEDICAID

## 2021-05-12 VITALS — HEIGHT: 67 IN | WEIGHT: 293 LBS | BODY MASS INDEX: 45.99 KG/M2

## 2021-05-12 DIAGNOSIS — L97.509 TYPE 2 DIABETES MELLITUS WITH FOOT ULCER, WITH LONG-TERM CURRENT USE OF INSULIN: Primary | ICD-10-CM

## 2021-05-12 DIAGNOSIS — E11.621 TYPE 2 DIABETES MELLITUS WITH FOOT ULCER, WITH LONG-TERM CURRENT USE OF INSULIN: Primary | ICD-10-CM
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
## 2021-05-12 DIAGNOSIS — S99.922A INJURY OF TOENAIL OF LEFT FOOT, INITIAL ENCOUNTER: ICD-10-CM

## 2021-05-12 DIAGNOSIS — Z79.4 TYPE 2 DIABETES MELLITUS WITH FOOT ULCER, WITH LONG-TERM CURRENT USE OF INSULIN: Primary | ICD-10-CM

## 2021-05-12 PROCEDURE — 99214 OFFICE O/P EST MOD 30 MIN: CPT | Mod: PBBFAC,25,PO | Performed by: PODIATRIST

## 2021-05-12 PROCEDURE — 73660 XR TOE 2 OR MORE VIEWS LEFT: ICD-10-PCS | Mod: 26,LT,, | Performed by: RADIOLOGY

## 2021-05-12 PROCEDURE — 99214 OFFICE O/P EST MOD 30 MIN: CPT | Mod: S$PBB,,, | Performed by: PODIATRIST

## 2021-05-12 PROCEDURE — 99999 PR PBB SHADOW E&M-EST. PATIENT-LVL IV: CPT | Mod: PBBFAC,,, | Performed by: PODIATRIST

## 2021-05-12 PROCEDURE — 73660 X-RAY EXAM OF TOE(S): CPT | Mod: 26,LT,, | Performed by: RADIOLOGY

## 2021-05-12 PROCEDURE — 99999 PR PBB SHADOW E&M-EST. PATIENT-LVL IV: ICD-10-PCS | Mod: PBBFAC,,, | Performed by: PODIATRIST

## 2021-05-12 PROCEDURE — 73660 X-RAY EXAM OF TOE(S): CPT | Mod: TC,FY,PO,LT

## 2021-05-12 PROCEDURE — 99214 PR OFFICE/OUTPT VISIT, EST, LEVL IV, 30-39 MIN: ICD-10-PCS | Mod: S$PBB,,, | Performed by: PODIATRIST

## 2021-05-12 RX ORDER — DOXYCYCLINE 100 MG/1
100 CAPSULE ORAL 2 TIMES DAILY
Qty: 14 CAPSULE | Refills: 0 | Status: SHIPPED | OUTPATIENT
Start: 2021-05-12

## 2021-05-27 ENCOUNTER — OFFICE VISIT (OUTPATIENT)
Dept: PODIATRY | Facility: CLINIC | Age: 56
End: 2021-05-27
Payer: MEDICAID

## 2021-05-27 VITALS — BODY MASS INDEX: 45.99 KG/M2 | HEIGHT: 67 IN | WEIGHT: 293 LBS

## 2021-05-27 DIAGNOSIS — S99.922D INJURY OF TOENAIL OF LEFT FOOT, SUBSEQUENT ENCOUNTER: ICD-10-CM

## 2021-05-27 DIAGNOSIS — E11.621 TYPE 2 DIABETES MELLITUS WITH FOOT ULCER, WITH LONG-TERM CURRENT USE OF INSULIN: Primary | ICD-10-CM

## 2021-05-27 DIAGNOSIS — Z79.4 TYPE 2 DIABETES MELLITUS WITH FOOT ULCER, WITH LONG-TERM CURRENT USE OF INSULIN: Primary | ICD-10-CM

## 2021-05-27 DIAGNOSIS — L97.509 TYPE 2 DIABETES MELLITUS WITH FOOT ULCER, WITH LONG-TERM CURRENT USE OF INSULIN: Primary | ICD-10-CM

## 2021-05-27 PROCEDURE — 99214 OFFICE O/P EST MOD 30 MIN: CPT | Mod: PBBFAC,PO | Performed by: PODIATRIST

## 2021-05-27 PROCEDURE — 99212 OFFICE O/P EST SF 10 MIN: CPT | Mod: S$PBB,,, | Performed by: PODIATRIST

## 2021-05-27 PROCEDURE — 99212 PR OFFICE/OUTPT VISIT, EST, LEVL II, 10-19 MIN: ICD-10-PCS | Mod: S$PBB,,, | Performed by: PODIATRIST

## 2021-05-27 PROCEDURE — 99999 PR PBB SHADOW E&M-EST. PATIENT-LVL IV: ICD-10-PCS | Mod: PBBFAC,,, | Performed by: PODIATRIST

## 2021-05-27 PROCEDURE — 99999 PR PBB SHADOW E&M-EST. PATIENT-LVL IV: CPT | Mod: PBBFAC,,, | Performed by: PODIATRIST

## 2021-07-12 ENCOUNTER — LAB VISIT (OUTPATIENT)
Dept: LAB | Facility: OTHER | Age: 56
End: 2021-07-12
Payer: MEDICAID

## 2021-07-12 DIAGNOSIS — Z20.822 ENCOUNTER FOR LABORATORY TESTING FOR COVID-19 VIRUS: ICD-10-CM

## 2021-07-12 PROCEDURE — U0003 INFECTIOUS AGENT DETECTION BY NUCLEIC ACID (DNA OR RNA); SEVERE ACUTE RESPIRATORY SYNDROME CORONAVIRUS 2 (SARS-COV-2) (CORONAVIRUS DISEASE [COVID-19]), AMPLIFIED PROBE TECHNIQUE, MAKING USE OF HIGH THROUGHPUT TECHNOLOGIES AS DESCRIBED BY CMS-2020-01-R: HCPCS | Performed by: NURSE PRACTITIONER

## 2021-07-13 LAB
SARS-COV-2 RNA RESP QL NAA+PROBE: NOT DETECTED
SARS-COV-2- CYCLE NUMBER: -1

## 2021-11-15 ENCOUNTER — OFFICE VISIT (OUTPATIENT)
Dept: PODIATRY | Facility: CLINIC | Age: 56
End: 2021-11-15
Payer: MEDICAID

## 2021-11-15 VITALS — BODY MASS INDEX: 45.99 KG/M2 | HEIGHT: 67 IN | WEIGHT: 293 LBS

## 2021-11-15 DIAGNOSIS — B35.1 ONYCHOMYCOSIS DUE TO DERMATOPHYTE: ICD-10-CM

## 2021-11-15 DIAGNOSIS — Z79.4 TYPE 2 DIABETES MELLITUS WITH RETINOPATHY, WITH LONG-TERM CURRENT USE OF INSULIN, MACULAR EDEMA PRESENCE UNSPECIFIED, UNSPECIFIED LATERALITY, UNSPECIFIED RETINOPATHY SEVERITY: Primary | ICD-10-CM

## 2021-11-15 DIAGNOSIS — E11.319 TYPE 2 DIABETES MELLITUS WITH RETINOPATHY, WITH LONG-TERM CURRENT USE OF INSULIN, MACULAR EDEMA PRESENCE UNSPECIFIED, UNSPECIFIED LATERALITY, UNSPECIFIED RETINOPATHY SEVERITY: Primary | ICD-10-CM

## 2021-11-15 PROCEDURE — 99214 OFFICE O/P EST MOD 30 MIN: CPT | Mod: S$PBB,,, | Performed by: PODIATRIST

## 2021-11-15 PROCEDURE — 99214 PR OFFICE/OUTPT VISIT, EST, LEVL IV, 30-39 MIN: ICD-10-PCS | Mod: S$PBB,,, | Performed by: PODIATRIST

## 2021-11-15 PROCEDURE — 99214 OFFICE O/P EST MOD 30 MIN: CPT | Mod: PBBFAC,PO | Performed by: PODIATRIST

## 2021-11-15 PROCEDURE — 99999 PR PBB SHADOW E&M-EST. PATIENT-LVL IV: ICD-10-PCS | Mod: PBBFAC,,, | Performed by: PODIATRIST

## 2021-11-15 PROCEDURE — 99999 PR PBB SHADOW E&M-EST. PATIENT-LVL IV: CPT | Mod: PBBFAC,,, | Performed by: PODIATRIST

## 2021-11-15 RX ORDER — CICLOPIROX 80 MG/ML
SOLUTION TOPICAL NIGHTLY
Qty: 1 EACH | Refills: 3 | Status: SHIPPED | OUTPATIENT
Start: 2021-11-15

## 2022-10-13 ENCOUNTER — OFFICE VISIT (OUTPATIENT)
Dept: PODIATRY | Facility: CLINIC | Age: 57
End: 2022-10-13
Payer: MEDICAID

## 2022-10-13 VITALS — WEIGHT: 293 LBS | HEIGHT: 67 IN | BODY MASS INDEX: 45.99 KG/M2

## 2022-10-13 DIAGNOSIS — E11.40 TYPE 2 DIABETES MELLITUS WITH DIABETIC NEUROPATHY, WITH LONG-TERM CURRENT USE OF INSULIN: Primary | ICD-10-CM

## 2022-10-13 DIAGNOSIS — Z79.4 TYPE 2 DIABETES MELLITUS WITH DIABETIC NEUROPATHY, WITH LONG-TERM CURRENT USE OF INSULIN: Primary | ICD-10-CM

## 2022-10-13 PROCEDURE — 1159F MED LIST DOCD IN RCRD: CPT | Mod: CPTII,,, | Performed by: PODIATRIST

## 2022-10-13 PROCEDURE — 99213 PR OFFICE/OUTPT VISIT, EST, LEVL III, 20-29 MIN: ICD-10-PCS | Mod: S$PBB,,, | Performed by: PODIATRIST

## 2022-10-13 PROCEDURE — 4010F ACE/ARB THERAPY RXD/TAKEN: CPT | Mod: CPTII,,, | Performed by: PODIATRIST

## 2022-10-13 PROCEDURE — 1159F PR MEDICATION LIST DOCUMENTED IN MEDICAL RECORD: ICD-10-PCS | Mod: CPTII,,, | Performed by: PODIATRIST

## 2022-10-13 PROCEDURE — 99213 OFFICE O/P EST LOW 20 MIN: CPT | Mod: PBBFAC,PO | Performed by: PODIATRIST

## 2022-10-13 PROCEDURE — 4010F PR ACE/ARB THEARPY RXD/TAKEN: ICD-10-PCS | Mod: CPTII,,, | Performed by: PODIATRIST

## 2022-10-13 PROCEDURE — 99213 OFFICE O/P EST LOW 20 MIN: CPT | Mod: S$PBB,,, | Performed by: PODIATRIST

## 2022-10-13 PROCEDURE — 99999 PR PBB SHADOW E&M-EST. PATIENT-LVL III: ICD-10-PCS | Mod: PBBFAC,,, | Performed by: PODIATRIST

## 2022-10-13 PROCEDURE — 99999 PR PBB SHADOW E&M-EST. PATIENT-LVL III: CPT | Mod: PBBFAC,,, | Performed by: PODIATRIST

## 2022-10-17 NOTE — PROGRESS NOTES
Subjective:      Patient ID: Tracey Campbell is a 56 y.o. female.    Chief Complaint: Diabetes Mellitus, Diabetic Foot Exam, Foot Problem (Bilateral foot pain ), and Foot Pain    Tracey is a 56 y.o. female who presents to the clinic upon referral from Dr. Constance cash. provider found  for evaluation and treatment of diabetic feet. Tracey has a past medical history of Asthma, Depression, Diabetes mellitus, type 2, Hyperlipidemia, Hypertension, and Neuromuscular disorder. Presents for diabetic foot risk assessment. Reports h/o left foot ulceration, OM with PICC line treated in [place]. Reports noticing development of callus left plantar foot with discoloration. Reports starting recent job at [employer] where she is on her feet for many hours. Also reports right ankle pain and swelling x several weeks. Recently discharged from wound care. Reports went to  DM shoes however they did not fit and is waiting on new pair.     1/11/21: Reports new onset left ankle pain x several days. Denies trauma to area. Patient recently went to ED on 1/8/21. Venous duplex negative for DVT. Uric acid elevated.     5/12/21: Reports unusual color left great toenail. Denies pain to area.     5/27/21: F/u left great toe nail avulsion. No issues. Completed PO abx without issue. Pending receipt of DM shoes.     11/15/21: Reports recent left ankle fracture in July 2021 with subsequent L ankle ORIF at OSH. Presents today with fiberglass cast left foot.   Presents for diabetic foot risk assessment.   Reports elongated nails that are difficult to trim.     10/13/22:Presents for diabetic foot risk assessment.       PCP: Yumiko Pruitt MD    Date Last Seen by PCP: none found     Current shoe gear: Tennis shoes    Hemoglobin A1C   Date Value Ref Range Status   07/14/2021 13.2 (H) 4.7 - 5.6 % Final   07/23/2020 12.7 (H) 4.0 - 5.6 % Final     Comment:     ADA Screening Guidelines:  5.7-6.4%  Consistent with prediabetes  >or=6.5%  Consistent with diabetes  High  levels of fetal hemoglobin interfere with the HbA1C  assay. Heterozygous hemoglobin variants (HbS, HgC, etc)do  not significantly interfere with this assay.   However, presence of multiple variants may affect accuracy.             Review of Systems   Constitutional: Negative for chills.   Cardiovascular:  Negative for chest pain and claudication.   Respiratory:  Negative for cough.    Skin:  Positive for color change, dry skin and nail changes.   Musculoskeletal:  Positive for joint pain.   Gastrointestinal:  Negative for nausea.   Neurological:  Positive for paresthesias. Negative for numbness.   Psychiatric/Behavioral:  The patient is not nervous/anxious.          Objective:      Physical Exam  Constitutional:       Appearance: She is well-developed.      Comments: Oriented to time, place, and person.   Cardiovascular:      Comments: DP and PT pulses are palpable bilaterally. 3 sec capillary refill time and toes and feet are warm to touch proximally .  There is  hair growth on the feet and toes b/l. There is no edema b/l. No spider veins or varicosities present b/l.     Musculoskeletal:      Comments: Fiberglass cast left leg.    Feet:      Right foot:      Skin integrity: No callus or dry skin.      Left foot:      Skin integrity: No callus or dry skin.   Lymphadenopathy:      Comments: Negative lymphadenopathy bilateral popliteal fossa and tarsal tunnel.   Skin:     Comments:  Toenails 1-5 bilaterally are elongated by 2-3 mm, thickened by 2-3 mm, discolored/yellowed, dystrophic, brittle with subungual debris.       Neurological:      Mental Status: She is alert.      Comments: Light touch, proprioception, and sharp/dull sensation are all intact bilaterally. Protective threshold with the Rhododendron-Wienstein monofilament is intact bilaterally.    Psychiatric:         Behavior: Behavior is cooperative.     S/p left hallux nail avulsion. Stable.               Assessment:       Encounter Diagnosis   Name Primary?     Type 2 diabetes mellitus with diabetic neuropathy, with long-term current use of insulin Yes           Plan:       Tracey was seen today for diabetes mellitus, diabetic foot exam, foot problem and foot pain.    Diagnoses and all orders for this visit:    Type 2 diabetes mellitus with diabetic neuropathy, with long-term current use of insulin      I counseled the patient on her conditions, their implications and medical management.      - Shoe inspection. Diabetic Foot Education. Patient reminded of the importance of good nutrition and blood sugar control to help prevent podiatric complications of diabetes. Patient instructed on proper foot hygeine. We discussed wearing proper shoe gear, daily foot inspections, never walking without protective shoe gear, caution putting sharp instruments to feet     - Discussed DM foot care:  Wear comfortable, proper fitting shoes. Wash feet daily. Dry well. After drying, apply moisturizer to feet (no lotion to webspaces). Inspect feet daily for skin breaks, blisters, swelling, or redness. Wear cotton socks (preferably white)  Change socks every day. Do NOT walk barefoot. Do NOT use heating pads or warm/hot water soaks     - With patient's permission, nails were aggressively reduced and debrided x 10 to their soft tissue attachment mechanically and with electric , removing all offending nail and debris. Patient relates relief following the procedure. He will continue to monitor the areas daily, inspect his feet, wear protective shoe gear when ambulatory, moisturizer to maintain skin integrity and follow in this office in approximately 6-12 sooner p.r.n.     At patient's request, I discussed different treatments for toenail fungus. We discussed oral antifungals but I did not recommend them as a first line treatment since the medication is taken internally and can have side effects such as rash, taste disturbances, and liver enzyme elevation. We discussed topical Penlac to be  applied daily and removed weekly. Pt. Expresses understanding and would like to try the Penlac. Rx sent to the pharmacy.         F/u one year DM foot exam sooner PRN.

## 2023-10-12 ENCOUNTER — HOSPITAL ENCOUNTER (OUTPATIENT)
Dept: RADIOLOGY | Facility: HOSPITAL | Age: 58
Discharge: HOME | End: 2023-10-12
Attending: STUDENT IN AN ORGANIZED HEALTH CARE EDUCATION/TRAINING PROGRAM
Payer: MEDICAID

## 2023-10-12 ENCOUNTER — OFFICE VISIT (OUTPATIENT)
Dept: PODIATRY | Facility: CLINIC | Age: 58
End: 2023-10-12
Payer: MEDICAID

## 2023-10-12 VITALS — BODY MASS INDEX: 45.99 KG/M2 | WEIGHT: 293 LBS | HEIGHT: 67 IN

## 2023-10-12 DIAGNOSIS — M79.672 LEFT FOOT PAIN: ICD-10-CM

## 2023-10-12 DIAGNOSIS — Z79.4 TYPE 2 DIABETES MELLITUS WITH DIABETIC NEUROPATHY, WITH LONG-TERM CURRENT USE OF INSULIN: Primary | ICD-10-CM

## 2023-10-12 DIAGNOSIS — M21.42 PES PLANUS OF LEFT FOOT: ICD-10-CM

## 2023-10-12 DIAGNOSIS — E11.40 TYPE 2 DIABETES MELLITUS WITH DIABETIC NEUROPATHY, WITH LONG-TERM CURRENT USE OF INSULIN: Primary | ICD-10-CM

## 2023-10-12 DIAGNOSIS — M20.5X2 MALLET TOE OF LEFT FOOT: ICD-10-CM

## 2023-10-12 PROCEDURE — 99214 OFFICE O/P EST MOD 30 MIN: CPT | Mod: PBBFAC,PO | Performed by: STUDENT IN AN ORGANIZED HEALTH CARE EDUCATION/TRAINING PROGRAM

## 2023-10-12 PROCEDURE — 3008F PR BODY MASS INDEX (BMI) DOCUMENTED: ICD-10-PCS | Mod: CPTII,,, | Performed by: STUDENT IN AN ORGANIZED HEALTH CARE EDUCATION/TRAINING PROGRAM

## 2023-10-12 PROCEDURE — 99215 OFFICE O/P EST HI 40 MIN: CPT | Mod: S$PBB,,, | Performed by: STUDENT IN AN ORGANIZED HEALTH CARE EDUCATION/TRAINING PROGRAM

## 2023-10-12 PROCEDURE — 73610 X-RAY EXAM OF ANKLE: CPT | Mod: 26,LT,, | Performed by: RADIOLOGY

## 2023-10-12 PROCEDURE — 3008F BODY MASS INDEX DOCD: CPT | Mod: CPTII,,, | Performed by: STUDENT IN AN ORGANIZED HEALTH CARE EDUCATION/TRAINING PROGRAM

## 2023-10-12 PROCEDURE — 73630 XR FOOT COMPLETE 3 VIEW LEFT: ICD-10-PCS | Mod: 26,LT,, | Performed by: RADIOLOGY

## 2023-10-12 PROCEDURE — 3046F HEMOGLOBIN A1C LEVEL >9.0%: CPT | Mod: CPTII,,, | Performed by: STUDENT IN AN ORGANIZED HEALTH CARE EDUCATION/TRAINING PROGRAM

## 2023-10-12 PROCEDURE — 3046F PR MOST RECENT HEMOGLOBIN A1C LEVEL > 9.0%: ICD-10-PCS | Mod: CPTII,,, | Performed by: STUDENT IN AN ORGANIZED HEALTH CARE EDUCATION/TRAINING PROGRAM

## 2023-10-12 PROCEDURE — 4010F ACE/ARB THERAPY RXD/TAKEN: CPT | Mod: CPTII,,, | Performed by: STUDENT IN AN ORGANIZED HEALTH CARE EDUCATION/TRAINING PROGRAM

## 2023-10-12 PROCEDURE — 99215 PR OFFICE/OUTPT VISIT, EST, LEVL V, 40-54 MIN: ICD-10-PCS | Mod: S$PBB,,, | Performed by: STUDENT IN AN ORGANIZED HEALTH CARE EDUCATION/TRAINING PROGRAM

## 2023-10-12 PROCEDURE — 73610 X-RAY EXAM OF ANKLE: CPT | Mod: TC,PO,LT

## 2023-10-12 PROCEDURE — 99999 PR PBB SHADOW E&M-EST. PATIENT-LVL IV: CPT | Mod: PBBFAC,,, | Performed by: STUDENT IN AN ORGANIZED HEALTH CARE EDUCATION/TRAINING PROGRAM

## 2023-10-12 PROCEDURE — 1160F PR REVIEW ALL MEDS BY PRESCRIBER/CLIN PHARMACIST DOCUMENTED: ICD-10-PCS | Mod: CPTII,,, | Performed by: STUDENT IN AN ORGANIZED HEALTH CARE EDUCATION/TRAINING PROGRAM

## 2023-10-12 PROCEDURE — 1159F MED LIST DOCD IN RCRD: CPT | Mod: CPTII,,, | Performed by: STUDENT IN AN ORGANIZED HEALTH CARE EDUCATION/TRAINING PROGRAM

## 2023-10-12 PROCEDURE — 4010F PR ACE/ARB THEARPY RXD/TAKEN: ICD-10-PCS | Mod: CPTII,,, | Performed by: STUDENT IN AN ORGANIZED HEALTH CARE EDUCATION/TRAINING PROGRAM

## 2023-10-12 PROCEDURE — 73610 XR ANKLE COMPLETE 3 VIEW LEFT: ICD-10-PCS | Mod: 26,LT,, | Performed by: RADIOLOGY

## 2023-10-12 PROCEDURE — 73630 X-RAY EXAM OF FOOT: CPT | Mod: 26,LT,, | Performed by: RADIOLOGY

## 2023-10-12 PROCEDURE — 73630 X-RAY EXAM OF FOOT: CPT | Mod: TC,PO,LT

## 2023-10-12 PROCEDURE — 1159F PR MEDICATION LIST DOCUMENTED IN MEDICAL RECORD: ICD-10-PCS | Mod: CPTII,,, | Performed by: STUDENT IN AN ORGANIZED HEALTH CARE EDUCATION/TRAINING PROGRAM

## 2023-10-12 PROCEDURE — 99999 PR PBB SHADOW E&M-EST. PATIENT-LVL IV: ICD-10-PCS | Mod: PBBFAC,,, | Performed by: STUDENT IN AN ORGANIZED HEALTH CARE EDUCATION/TRAINING PROGRAM

## 2023-10-12 PROCEDURE — 1160F RVW MEDS BY RX/DR IN RCRD: CPT | Mod: CPTII,,, | Performed by: STUDENT IN AN ORGANIZED HEALTH CARE EDUCATION/TRAINING PROGRAM

## 2023-10-12 NOTE — PROGRESS NOTES
Subjective:      Patient ID: Tracey Campbell is a 57 y.o. female.    Chief Complaint: Diabetic Foot Exam    Tracey is a 57 y.o. female who presents to the clinic upon referral from Dr. Constance cash. provider found  for evaluation and treatment of diabetic feet. Tracey has a past medical history of Asthma, Depression, Diabetes mellitus, type 2, Hyperlipidemia, Hypertension, and Neuromuscular disorder. Patient relates no major problem with feet. Only complaints today consist of numbness and tingling and left foot pain after a severe ankle fracture 2 years ago.    PCP: Yumiko Pruitt MD    Date Last Seen by PCP: 2023    Current shoe gear: Casual shoes    Hemoglobin A1C   Date Value Ref Range Status   05/18/2023 15.5 (H) 4.8 - 5.6 % Final     Comment:              Prediabetes: 5.7 - 6.4           Diabetes: >6.4           Glycemic control for adults with diabetes: <7.0   01/31/2023 13.5 (H) <=6.5 % Final   07/14/2021 13.2 (H) 4.7 - 5.6 % Final   07/23/2020 12.7 (H) 4.0 - 5.6 % Final     Comment:     ADA Screening Guidelines:  5.7-6.4%  Consistent with prediabetes  >or=6.5%  Consistent with diabetes  High levels of fetal hemoglobin interfere with the HbA1C  assay. Heterozygous hemoglobin variants (HbS, HgC, etc)do  not significantly interfere with this assay.   However, presence of multiple variants may affect accuracy.           Review of Systems   Cardiovascular:  Positive for leg swelling.   Musculoskeletal:  Positive for arthritis, joint pain and joint swelling.   Neurological:  Positive for numbness.   All other systems reviewed and are negative.          Objective:      Physical Exam  Cardiovascular:      Pulses:           Dorsalis pedis pulses are 2+ on the right side and 2+ on the left side.        Posterior tibial pulses are 2+ on the right side and 2+ on the left side.   Feet:      Right foot:      Protective Sensation: 5 sites tested.  0 sites sensed.      Skin integrity: Skin integrity normal.      Left foot:       Protective Sensation: 5 sites tested.  0 sites sensed.      Skin integrity: Skin integrity normal.      Comments: Left ankle:  Moderate, diffuse edema. Surgical scars medially and laterally on the ankle. There is some PF/DF motion at the ankle but no available STJ ROM. The foot is in severe valgus with total collapse of the medial longitudinal arch. There is malleting of the 1st, 2nd and 3rd toes.               Assessment:       Encounter Diagnoses   Name Primary?    Type 2 diabetes mellitus with diabetic neuropathy, with long-term current use of insulin Yes    Left foot pain     Pes planus of left foot     Mallet toe of left foot          Plan:       Tracey was seen today for diabetic foot exam.    Diagnoses and all orders for this visit:    Type 2 diabetes mellitus with diabetic neuropathy, with long-term current use of insulin  -     DIABETIC SHOES FOR HOME USE    Left foot pain  -     X-Ray Foot Complete Left; Future  -     X-Ray Ankle Complete Left; Future  -     DIABETIC SHOES FOR HOME USE    Pes planus of left foot  -     DIABETIC SHOES FOR HOME USE    Mallet toe of left foot  -     DIABETIC SHOES FOR HOME USE      I counseled the patient on her conditions, their implications and medical management.    Patient was evaluated and risk assessed today. The patient is at high risk for developing pedal complications due to peripheral neuropathy and severely uncontrolled DMII. I explained to the patient that these factors can make healing injuries difficult leading to wounds or gangrene.    In general, I recommend monitoring the feet daily for any areas of pressure or injuries. If any areas appear to be healing slowly or become infected, seek medical attention as soon as possible. Wear supportive shoes and avoid barefoot walking.     Rx for DM shoes and inserts.    Xrays to evaluate for the condition of the left foot and ankle. Possible AFO if there is any potential correction possible.    >50 minutes was spent on  face to face counseling for the patient's conditions and reviewing historical charts, op notes and imaging impressions.     Follow up in 1 year

## 2023-12-15 ENCOUNTER — TELEPHONE (OUTPATIENT)
Dept: PODIATRY | Facility: CLINIC | Age: 58
End: 2023-12-15
Payer: MEDICAID

## 2023-12-15 NOTE — TELEPHONE ENCOUNTER
----- Message from Perlita Zapata sent at 12/15/2023  1:43 PM CST -----  Regarding: advice  Type:  Needs Medical Advice    Who Called: pt    Best Call Back Number: [phone]      Additional Information: pt needs a call back to discuss the order she received.  please call to discuss.

## 2023-12-15 NOTE — TELEPHONE ENCOUNTER
Spoke with patient and gave information from vendor list for DM shoe Rx. Mailed vendor list as well